# Patient Record
Sex: FEMALE | Race: WHITE | NOT HISPANIC OR LATINO | URBAN - METROPOLITAN AREA
[De-identification: names, ages, dates, MRNs, and addresses within clinical notes are randomized per-mention and may not be internally consistent; named-entity substitution may affect disease eponyms.]

---

## 2021-12-28 ENCOUNTER — INPATIENT (INPATIENT)
Facility: HOSPITAL | Age: 86
LOS: 0 days | DRG: 64 | End: 2021-12-29
Attending: NEUROLOGICAL SURGERY | Admitting: NEUROLOGICAL SURGERY
Payer: MEDICARE

## 2021-12-28 ENCOUNTER — EMERGENCY (EMERGENCY)
Facility: HOSPITAL | Age: 86
LOS: 1 days | Discharge: ANOTHER HOSPITAL | End: 2021-12-28
Attending: EMERGENCY MEDICINE
Payer: MEDICARE

## 2021-12-28 VITALS
TEMPERATURE: 95 F | OXYGEN SATURATION: 100 % | SYSTOLIC BLOOD PRESSURE: 148 MMHG | RESPIRATION RATE: 16 BRPM | HEART RATE: 89 BPM | DIASTOLIC BLOOD PRESSURE: 78 MMHG

## 2021-12-28 VITALS
DIASTOLIC BLOOD PRESSURE: 88 MMHG | RESPIRATION RATE: 17 BRPM | TEMPERATURE: 94 F | SYSTOLIC BLOOD PRESSURE: 198 MMHG | OXYGEN SATURATION: 100 % | HEART RATE: 85 BPM

## 2021-12-28 VITALS
SYSTOLIC BLOOD PRESSURE: 182 MMHG | HEIGHT: 59 IN | RESPIRATION RATE: 22 BRPM | OXYGEN SATURATION: 97 % | WEIGHT: 127.87 LBS | HEART RATE: 90 BPM | DIASTOLIC BLOOD PRESSURE: 96 MMHG

## 2021-12-28 DIAGNOSIS — I61.9 NONTRAUMATIC INTRACEREBRAL HEMORRHAGE, UNSPECIFIED: ICD-10-CM

## 2021-12-28 LAB
ALBUMIN SERPL ELPH-MCNC: 3.9 G/DL — SIGNIFICANT CHANGE UP (ref 3.5–5)
ALBUMIN SERPL ELPH-MCNC: 4.5 G/DL — SIGNIFICANT CHANGE UP (ref 3.3–5)
ALP SERPL-CCNC: 102 U/L — SIGNIFICANT CHANGE UP (ref 40–120)
ALP SERPL-CCNC: 98 U/L — SIGNIFICANT CHANGE UP (ref 40–120)
ALT FLD-CCNC: 19 U/L — SIGNIFICANT CHANGE UP (ref 10–45)
ALT FLD-CCNC: 26 U/L DA — SIGNIFICANT CHANGE UP (ref 10–60)
ANION GAP SERPL CALC-SCNC: 10 MMOL/L — SIGNIFICANT CHANGE UP (ref 5–17)
ANION GAP SERPL CALC-SCNC: 13 MMOL/L — SIGNIFICANT CHANGE UP (ref 5–17)
ANION GAP SERPL CALC-SCNC: 21 MMOL/L — HIGH (ref 5–17)
APPEARANCE UR: CLEAR — SIGNIFICANT CHANGE UP
APTT BLD: 27.2 SEC — LOW (ref 27.5–35.5)
APTT BLD: 27.5 SEC — SIGNIFICANT CHANGE UP (ref 27.5–35.5)
AST SERPL-CCNC: 27 U/L — SIGNIFICANT CHANGE UP (ref 10–40)
AST SERPL-CCNC: 31 U/L — SIGNIFICANT CHANGE UP (ref 10–40)
BASOPHILS # BLD AUTO: 0.02 K/UL — SIGNIFICANT CHANGE UP (ref 0–0.2)
BASOPHILS # BLD AUTO: 0.04 K/UL — SIGNIFICANT CHANGE UP (ref 0–0.2)
BASOPHILS NFR BLD AUTO: 0.2 % — SIGNIFICANT CHANGE UP (ref 0–2)
BASOPHILS NFR BLD AUTO: 0.8 % — SIGNIFICANT CHANGE UP (ref 0–2)
BILIRUB SERPL-MCNC: 0.3 MG/DL — SIGNIFICANT CHANGE UP (ref 0.2–1.2)
BILIRUB SERPL-MCNC: 0.5 MG/DL — SIGNIFICANT CHANGE UP (ref 0.2–1.2)
BILIRUB UR-MCNC: NEGATIVE — SIGNIFICANT CHANGE UP
BLD GP AB SCN SERPL QL: NEGATIVE — SIGNIFICANT CHANGE UP
BUN SERPL-MCNC: 17 MG/DL — SIGNIFICANT CHANGE UP (ref 7–23)
BUN SERPL-MCNC: 23 MG/DL — SIGNIFICANT CHANGE UP (ref 7–23)
BUN SERPL-MCNC: 26 MG/DL — HIGH (ref 7–18)
CALCIUM SERPL-MCNC: 10.2 MG/DL — SIGNIFICANT CHANGE UP (ref 8.4–10.5)
CALCIUM SERPL-MCNC: 9.7 MG/DL — SIGNIFICANT CHANGE UP (ref 8.4–10.5)
CALCIUM SERPL-MCNC: 9.9 MG/DL — SIGNIFICANT CHANGE UP (ref 8.4–10.5)
CHLORIDE SERPL-SCNC: 103 MMOL/L — SIGNIFICANT CHANGE UP (ref 96–108)
CHLORIDE SERPL-SCNC: 111 MMOL/L — HIGH (ref 96–108)
CHLORIDE SERPL-SCNC: 96 MMOL/L — SIGNIFICANT CHANGE UP (ref 96–108)
CK SERPL-CCNC: 114 U/L — SIGNIFICANT CHANGE UP (ref 21–215)
CK SERPL-CCNC: 119 U/L — SIGNIFICANT CHANGE UP (ref 25–170)
CO2 SERPL-SCNC: 21 MMOL/L — LOW (ref 22–31)
CO2 SERPL-SCNC: 23 MMOL/L — SIGNIFICANT CHANGE UP (ref 22–31)
CO2 SERPL-SCNC: 27 MMOL/L — SIGNIFICANT CHANGE UP (ref 22–31)
COLOR SPEC: YELLOW — SIGNIFICANT CHANGE UP
CREAT SERPL-MCNC: 0.67 MG/DL — SIGNIFICANT CHANGE UP (ref 0.5–1.3)
CREAT SERPL-MCNC: 0.71 MG/DL — SIGNIFICANT CHANGE UP (ref 0.5–1.3)
CREAT SERPL-MCNC: 0.81 MG/DL — SIGNIFICANT CHANGE UP (ref 0.5–1.3)
DIFF PNL FLD: ABNORMAL
EOSINOPHIL # BLD AUTO: 0.01 K/UL — SIGNIFICANT CHANGE UP (ref 0–0.5)
EOSINOPHIL # BLD AUTO: 0.19 K/UL — SIGNIFICANT CHANGE UP (ref 0–0.5)
EOSINOPHIL NFR BLD AUTO: 0.1 % — SIGNIFICANT CHANGE UP (ref 0–6)
EOSINOPHIL NFR BLD AUTO: 3.7 % — SIGNIFICANT CHANGE UP (ref 0–6)
ETHANOL SERPL-MCNC: <3 MG/DL — SIGNIFICANT CHANGE UP (ref 0–10)
GLUCOSE SERPL-MCNC: 123 MG/DL — HIGH (ref 70–99)
GLUCOSE SERPL-MCNC: 132 MG/DL — HIGH (ref 70–99)
GLUCOSE SERPL-MCNC: 199 MG/DL — HIGH (ref 70–99)
GLUCOSE UR QL: NEGATIVE — SIGNIFICANT CHANGE UP
HCT VFR BLD CALC: 39 % — SIGNIFICANT CHANGE UP (ref 34.5–45)
HCT VFR BLD CALC: 39 % — SIGNIFICANT CHANGE UP (ref 34.5–45)
HEPARINASE TEG R TIME: 4.5 MIN — SIGNIFICANT CHANGE UP (ref 4.3–8.3)
HGB BLD-MCNC: 13 G/DL — SIGNIFICANT CHANGE UP (ref 11.5–15.5)
HGB BLD-MCNC: 13.1 G/DL — SIGNIFICANT CHANGE UP (ref 11.5–15.5)
IMM GRANULOCYTES NFR BLD AUTO: 0.4 % — SIGNIFICANT CHANGE UP (ref 0–1.5)
IMM GRANULOCYTES NFR BLD AUTO: 1.5 % — SIGNIFICANT CHANGE UP (ref 0–1.5)
INR BLD: 0.95 RATIO — SIGNIFICANT CHANGE UP (ref 0.88–1.16)
INR BLD: 0.96 RATIO — SIGNIFICANT CHANGE UP (ref 0.88–1.16)
KETONES UR-MCNC: NEGATIVE — SIGNIFICANT CHANGE UP
LACTATE SERPL-SCNC: 1.2 MMOL/L — SIGNIFICANT CHANGE UP (ref 0.7–2)
LEUKOCYTE ESTERASE UR-ACNC: NEGATIVE — SIGNIFICANT CHANGE UP
LIDOCAIN IGE QN: 153 U/L — SIGNIFICANT CHANGE UP (ref 73–393)
LYMPHOCYTES # BLD AUTO: 0.91 K/UL — LOW (ref 1–3.3)
LYMPHOCYTES # BLD AUTO: 1.85 K/UL — SIGNIFICANT CHANGE UP (ref 1–3.3)
LYMPHOCYTES # BLD AUTO: 35.7 % — SIGNIFICANT CHANGE UP (ref 13–44)
LYMPHOCYTES # BLD AUTO: 8 % — LOW (ref 13–44)
MAGNESIUM SERPL-MCNC: 2 MG/DL — SIGNIFICANT CHANGE UP (ref 1.6–2.6)
MCHC RBC-ENTMCNC: 30.9 PG — SIGNIFICANT CHANGE UP (ref 27–34)
MCHC RBC-ENTMCNC: 31 PG — SIGNIFICANT CHANGE UP (ref 27–34)
MCHC RBC-ENTMCNC: 33.3 GM/DL — SIGNIFICANT CHANGE UP (ref 32–36)
MCHC RBC-ENTMCNC: 33.6 GM/DL — SIGNIFICANT CHANGE UP (ref 32–36)
MCV RBC AUTO: 92.4 FL — SIGNIFICANT CHANGE UP (ref 80–100)
MCV RBC AUTO: 92.6 FL — SIGNIFICANT CHANGE UP (ref 80–100)
MONOCYTES # BLD AUTO: 0.55 K/UL — SIGNIFICANT CHANGE UP (ref 0–0.9)
MONOCYTES # BLD AUTO: 0.68 K/UL — SIGNIFICANT CHANGE UP (ref 0–0.9)
MONOCYTES NFR BLD AUTO: 10.6 % — SIGNIFICANT CHANGE UP (ref 2–14)
MONOCYTES NFR BLD AUTO: 6 % — SIGNIFICANT CHANGE UP (ref 2–14)
NEUTROPHILS # BLD AUTO: 2.47 K/UL — SIGNIFICANT CHANGE UP (ref 1.8–7.4)
NEUTROPHILS # BLD AUTO: 9.75 K/UL — HIGH (ref 1.8–7.4)
NEUTROPHILS NFR BLD AUTO: 47.7 % — SIGNIFICANT CHANGE UP (ref 43–77)
NEUTROPHILS NFR BLD AUTO: 85.3 % — HIGH (ref 43–77)
NITRITE UR-MCNC: NEGATIVE — SIGNIFICANT CHANGE UP
NRBC # BLD: 0 /100 WBCS — SIGNIFICANT CHANGE UP (ref 0–0)
NRBC # BLD: 0 /100 WBCS — SIGNIFICANT CHANGE UP (ref 0–0)
PA ADP PRP-ACNC: 232 PRU — SIGNIFICANT CHANGE UP (ref 194–417)
PH UR: 7 — SIGNIFICANT CHANGE UP (ref 5–8)
PHOSPHATE SERPL-MCNC: 3.2 MG/DL — SIGNIFICANT CHANGE UP (ref 2.5–4.5)
PLATELET # BLD AUTO: 201 K/UL — SIGNIFICANT CHANGE UP (ref 150–400)
PLATELET # BLD AUTO: 228 K/UL — SIGNIFICANT CHANGE UP (ref 150–400)
PLATELET MAPPING ACTF MAX AMPLITUDE: 12.4 MM — SIGNIFICANT CHANGE UP (ref 2–19)
PLATELET MAPPING ADP MAXIMUM AMPLITUDE: 56.8 MM — SIGNIFICANT CHANGE UP (ref 45–69)
PLATELET MAPPING ADP PERCENT INHIBITION: 6.9 % — SIGNIFICANT CHANGE UP (ref 0–17)
PLATELET MAPPING HKH MAXIMUM AMPLITUDE: 60.1 MM — SIGNIFICANT CHANGE UP (ref 53–68)
PLATELET RESPONSE ASPIRIN RESULT: 642 ARU — SIGNIFICANT CHANGE UP (ref 350–700)
POTASSIUM SERPL-MCNC: 2.9 MMOL/L — CRITICAL LOW (ref 3.5–5.3)
POTASSIUM SERPL-MCNC: 3.5 MMOL/L — SIGNIFICANT CHANGE UP (ref 3.5–5.3)
POTASSIUM SERPL-MCNC: 4.8 MMOL/L — SIGNIFICANT CHANGE UP (ref 3.5–5.3)
POTASSIUM SERPL-SCNC: 2.9 MMOL/L — CRITICAL LOW (ref 3.5–5.3)
POTASSIUM SERPL-SCNC: 3.5 MMOL/L — SIGNIFICANT CHANGE UP (ref 3.5–5.3)
POTASSIUM SERPL-SCNC: 4.8 MMOL/L — SIGNIFICANT CHANGE UP (ref 3.5–5.3)
PROT SERPL-MCNC: 7.4 G/DL — SIGNIFICANT CHANGE UP (ref 6–8.3)
PROT SERPL-MCNC: 7.5 G/DL — SIGNIFICANT CHANGE UP (ref 6–8.3)
PROT UR-MCNC: 15
PROTHROM AB SERPL-ACNC: 11.4 SEC — SIGNIFICANT CHANGE UP (ref 10.6–13.6)
PROTHROM AB SERPL-ACNC: 11.5 SEC — SIGNIFICANT CHANGE UP (ref 10.6–13.6)
RAPIDTEG MAXIMUM AMPLITUDE: 62.2 MM — SIGNIFICANT CHANGE UP (ref 52–70)
RBC # BLD: 4.21 M/UL — SIGNIFICANT CHANGE UP (ref 3.8–5.2)
RBC # BLD: 4.22 M/UL — SIGNIFICANT CHANGE UP (ref 3.8–5.2)
RBC # FLD: 13.5 % — SIGNIFICANT CHANGE UP (ref 10.3–14.5)
RBC # FLD: 13.6 % — SIGNIFICANT CHANGE UP (ref 10.3–14.5)
RH IG SCN BLD-IMP: POSITIVE — SIGNIFICANT CHANGE UP
SARS-COV-2 RNA SPEC QL NAA+PROBE: SIGNIFICANT CHANGE UP
SODIUM SERPL-SCNC: 138 MMOL/L — SIGNIFICANT CHANGE UP (ref 135–145)
SODIUM SERPL-SCNC: 140 MMOL/L — SIGNIFICANT CHANGE UP (ref 135–145)
SODIUM SERPL-SCNC: 147 MMOL/L — HIGH (ref 135–145)
SP GR SPEC: 1.01 — SIGNIFICANT CHANGE UP (ref 1.01–1.02)
TEG FUNCTIONAL FIBRINOGEN: 19.4 MM — SIGNIFICANT CHANGE UP (ref 15–32)
TEG MAXIMUM AMPLITUDE: 62.5 MM — SIGNIFICANT CHANGE UP (ref 52–69)
TEG REACTION TIME: 5.1 MIN — SIGNIFICANT CHANGE UP (ref 4.6–9.1)
TROPONIN I, HIGH SENSITIVITY RESULT: 10.7 NG/L — SIGNIFICANT CHANGE UP
TROPONIN T, HIGH SENSITIVITY RESULT: 117 NG/L — HIGH (ref 0–51)
TROPONIN T, HIGH SENSITIVITY RESULT: 223 NG/L — HIGH (ref 0–51)
UROBILINOGEN FLD QL: NEGATIVE — SIGNIFICANT CHANGE UP
WBC # BLD: 11.42 K/UL — HIGH (ref 3.8–10.5)
WBC # BLD: 5.18 K/UL — SIGNIFICANT CHANGE UP (ref 3.8–10.5)
WBC # FLD AUTO: 11.42 K/UL — HIGH (ref 3.8–10.5)
WBC # FLD AUTO: 5.18 K/UL — SIGNIFICANT CHANGE UP (ref 3.8–10.5)

## 2021-12-28 PROCEDURE — 93010 ELECTROCARDIOGRAM REPORT: CPT

## 2021-12-28 PROCEDURE — 85730 THROMBOPLASTIN TIME PARTIAL: CPT

## 2021-12-28 PROCEDURE — 99285 EMERGENCY DEPT VISIT HI MDM: CPT | Mod: 25

## 2021-12-28 PROCEDURE — 72125 CT NECK SPINE W/O DYE: CPT | Mod: MA

## 2021-12-28 PROCEDURE — 72125 CT NECK SPINE W/O DYE: CPT | Mod: 26,MA

## 2021-12-28 PROCEDURE — 70450 CT HEAD/BRAIN W/O DYE: CPT | Mod: 26,MA

## 2021-12-28 PROCEDURE — 72170 X-RAY EXAM OF PELVIS: CPT

## 2021-12-28 PROCEDURE — 86850 RBC ANTIBODY SCREEN: CPT

## 2021-12-28 PROCEDURE — 70450 CT HEAD/BRAIN W/O DYE: CPT | Mod: 26,77

## 2021-12-28 PROCEDURE — 99291 CRITICAL CARE FIRST HOUR: CPT

## 2021-12-28 PROCEDURE — 80307 DRUG TEST PRSMV CHEM ANLYZR: CPT

## 2021-12-28 PROCEDURE — 74177 CT ABD & PELVIS W/CONTRAST: CPT | Mod: MA

## 2021-12-28 PROCEDURE — 74177 CT ABD & PELVIS W/CONTRAST: CPT | Mod: 26,MA

## 2021-12-28 PROCEDURE — 43753 TX GASTRO INTUB W/ASP: CPT

## 2021-12-28 PROCEDURE — 81001 URINALYSIS AUTO W/SCOPE: CPT

## 2021-12-28 PROCEDURE — 70450 CT HEAD/BRAIN W/O DYE: CPT | Mod: MA

## 2021-12-28 PROCEDURE — 82550 ASSAY OF CK (CPK): CPT

## 2021-12-28 PROCEDURE — 85025 COMPLETE CBC W/AUTO DIFF WBC: CPT

## 2021-12-28 PROCEDURE — 36415 COLL VENOUS BLD VENIPUNCTURE: CPT

## 2021-12-28 PROCEDURE — 82962 GLUCOSE BLOOD TEST: CPT

## 2021-12-28 PROCEDURE — 72170 X-RAY EXAM OF PELVIS: CPT | Mod: 26

## 2021-12-28 PROCEDURE — 96375 TX/PRO/DX INJ NEW DRUG ADDON: CPT | Mod: XU

## 2021-12-28 PROCEDURE — 94002 VENT MGMT INPAT INIT DAY: CPT

## 2021-12-28 PROCEDURE — L8699: CPT

## 2021-12-28 PROCEDURE — 31500 INSERT EMERGENCY AIRWAY: CPT

## 2021-12-28 PROCEDURE — 71260 CT THORAX DX C+: CPT | Mod: MA

## 2021-12-28 PROCEDURE — 83690 ASSAY OF LIPASE: CPT

## 2021-12-28 PROCEDURE — 83605 ASSAY OF LACTIC ACID: CPT

## 2021-12-28 PROCEDURE — 84484 ASSAY OF TROPONIN QUANT: CPT

## 2021-12-28 PROCEDURE — 87635 SARS-COV-2 COVID-19 AMP PRB: CPT

## 2021-12-28 PROCEDURE — 71045 X-RAY EXAM CHEST 1 VIEW: CPT

## 2021-12-28 PROCEDURE — 86900 BLOOD TYPING SEROLOGIC ABO: CPT

## 2021-12-28 PROCEDURE — 71260 CT THORAX DX C+: CPT | Mod: 26,MA

## 2021-12-28 PROCEDURE — 71045 X-RAY EXAM CHEST 1 VIEW: CPT | Mod: 26,77

## 2021-12-28 PROCEDURE — 93005 ELECTROCARDIOGRAM TRACING: CPT

## 2021-12-28 PROCEDURE — 85610 PROTHROMBIN TIME: CPT

## 2021-12-28 PROCEDURE — 80053 COMPREHEN METABOLIC PANEL: CPT

## 2021-12-28 PROCEDURE — 86901 BLOOD TYPING SEROLOGIC RH(D): CPT

## 2021-12-28 PROCEDURE — 99292 CRITICAL CARE ADDL 30 MIN: CPT

## 2021-12-28 PROCEDURE — 99221 1ST HOSP IP/OBS SF/LOW 40: CPT

## 2021-12-28 PROCEDURE — 71045 X-RAY EXAM CHEST 1 VIEW: CPT | Mod: 26

## 2021-12-28 PROCEDURE — 96374 THER/PROPH/DIAG INJ IV PUSH: CPT | Mod: XU

## 2021-12-28 RX ORDER — PROPOFOL 10 MG/ML
10 INJECTION, EMULSION INTRAVENOUS
Qty: 500 | Refills: 0 | Status: DISCONTINUED | OUTPATIENT
Start: 2021-12-28 | End: 2021-12-31

## 2021-12-28 RX ORDER — POTASSIUM CHLORIDE 20 MEQ
10 PACKET (EA) ORAL
Refills: 0 | Status: COMPLETED | OUTPATIENT
Start: 2021-12-28 | End: 2021-12-28

## 2021-12-28 RX ORDER — LEVETIRACETAM 250 MG/1
1000 TABLET, FILM COATED ORAL ONCE
Refills: 0 | Status: DISCONTINUED | OUTPATIENT
Start: 2021-12-28 | End: 2021-12-31

## 2021-12-28 RX ORDER — MANNITOL
29 POWDER (GRAM) MISCELLANEOUS ONCE
Refills: 0 | Status: COMPLETED | OUTPATIENT
Start: 2021-12-28 | End: 2021-12-28

## 2021-12-28 RX ORDER — NICARDIPINE HYDROCHLORIDE 30 MG/1
5 CAPSULE, EXTENDED RELEASE ORAL
Qty: 40 | Refills: 0 | Status: DISCONTINUED | OUTPATIENT
Start: 2021-12-28 | End: 2021-12-28

## 2021-12-28 RX ORDER — PHENYLEPHRINE HYDROCHLORIDE 10 MG/ML
0.1 INJECTION INTRAVENOUS
Qty: 40 | Refills: 0 | Status: DISCONTINUED | OUTPATIENT
Start: 2021-12-28 | End: 2021-12-29

## 2021-12-28 RX ORDER — POTASSIUM CHLORIDE 20 MEQ
40 PACKET (EA) ORAL ONCE
Refills: 0 | Status: COMPLETED | OUTPATIENT
Start: 2021-12-28 | End: 2021-12-28

## 2021-12-28 RX ORDER — PROPOFOL 10 MG/ML
10 INJECTION, EMULSION INTRAVENOUS
Qty: 500 | Refills: 0 | Status: DISCONTINUED | OUTPATIENT
Start: 2021-12-28 | End: 2021-12-28

## 2021-12-28 RX ORDER — NICARDIPINE HYDROCHLORIDE 30 MG/1
5 CAPSULE, EXTENDED RELEASE ORAL
Qty: 40 | Refills: 0 | Status: DISCONTINUED | OUTPATIENT
Start: 2021-12-28 | End: 2021-12-31

## 2021-12-28 RX ORDER — TETANUS AND DIPHTHERIA TOXOIDS ADSORBED 2; 2 [LF]/.5ML; [LF]/.5ML
0.5 INJECTION INTRAMUSCULAR ONCE
Refills: 0 | Status: DISCONTINUED | OUTPATIENT
Start: 2021-12-28 | End: 2021-12-31

## 2021-12-28 RX ORDER — SENNA PLUS 8.6 MG/1
2 TABLET ORAL AT BEDTIME
Refills: 0 | Status: DISCONTINUED | OUTPATIENT
Start: 2021-12-28 | End: 2021-12-29

## 2021-12-28 RX ORDER — FENTANYL CITRATE 50 UG/ML
25 INJECTION INTRAVENOUS ONCE
Refills: 0 | Status: COMPLETED | OUTPATIENT
Start: 2021-12-28 | End: 2021-12-28

## 2021-12-28 RX ORDER — ETOMIDATE 2 MG/ML
40 INJECTION INTRAVENOUS ONCE
Refills: 0 | Status: COMPLETED | OUTPATIENT
Start: 2021-12-28 | End: 2021-12-28

## 2021-12-28 RX ORDER — LEVETIRACETAM 250 MG/1
500 TABLET, FILM COATED ORAL EVERY 12 HOURS
Refills: 0 | Status: DISCONTINUED | OUTPATIENT
Start: 2021-12-28 | End: 2021-12-29

## 2021-12-28 RX ORDER — SUCCINYLCHOLINE CHLORIDE 100 MG/5ML
100 SYRINGE (ML) INTRAVENOUS ONCE
Refills: 0 | Status: COMPLETED | OUTPATIENT
Start: 2021-12-28 | End: 2021-12-28

## 2021-12-28 RX ORDER — CHLORHEXIDINE GLUCONATE 213 G/1000ML
15 SOLUTION TOPICAL EVERY 12 HOURS
Refills: 0 | Status: DISCONTINUED | OUTPATIENT
Start: 2021-12-28 | End: 2021-12-31

## 2021-12-28 RX ORDER — ROCURONIUM BROMIDE 10 MG/ML
70 VIAL (ML) INTRAVENOUS ONCE
Refills: 0 | Status: COMPLETED | OUTPATIENT
Start: 2021-12-28 | End: 2021-12-28

## 2021-12-28 RX ORDER — DEXMEDETOMIDINE HYDROCHLORIDE IN 0.9% SODIUM CHLORIDE 4 UG/ML
0.2 INJECTION INTRAVENOUS
Qty: 200 | Refills: 0 | Status: DISCONTINUED | OUTPATIENT
Start: 2021-12-28 | End: 2021-12-28

## 2021-12-28 RX ORDER — SODIUM CHLORIDE 9 MG/ML
1000 INJECTION INTRAMUSCULAR; INTRAVENOUS; SUBCUTANEOUS ONCE
Refills: 0 | Status: COMPLETED | OUTPATIENT
Start: 2021-12-28 | End: 2021-12-28

## 2021-12-28 RX ORDER — CHLORHEXIDINE GLUCONATE 213 G/1000ML
15 SOLUTION TOPICAL EVERY 12 HOURS
Refills: 0 | Status: DISCONTINUED | OUTPATIENT
Start: 2021-12-28 | End: 2021-12-29

## 2021-12-28 RX ADMIN — PROPOFOL 4.2 MICROGRAM(S)/KG/MIN: 10 INJECTION, EMULSION INTRAVENOUS at 16:31

## 2021-12-28 RX ADMIN — Medication 145 GRAM(S): at 13:58

## 2021-12-28 RX ADMIN — NICARDIPINE HYDROCHLORIDE 25 MG/HR: 30 CAPSULE, EXTENDED RELEASE ORAL at 17:43

## 2021-12-28 RX ADMIN — Medication 100 MILLIGRAM(S): at 12:10

## 2021-12-28 RX ADMIN — NICARDIPINE HYDROCHLORIDE 25 MG/HR: 30 CAPSULE, EXTENDED RELEASE ORAL at 16:32

## 2021-12-28 RX ADMIN — CHLORHEXIDINE GLUCONATE 15 MILLILITER(S): 213 SOLUTION TOPICAL at 17:43

## 2021-12-28 RX ADMIN — Medication 100 MILLIEQUIVALENT(S): at 20:00

## 2021-12-28 RX ADMIN — PROPOFOL 3.48 MICROGRAM(S)/KG/MIN: 10 INJECTION, EMULSION INTRAVENOUS at 12:26

## 2021-12-28 RX ADMIN — NICARDIPINE HYDROCHLORIDE 25 MG/HR: 30 CAPSULE, EXTENDED RELEASE ORAL at 13:37

## 2021-12-28 RX ADMIN — Medication 70 MILLIGRAM(S): at 12:45

## 2021-12-28 RX ADMIN — Medication 100 MILLIEQUIVALENT(S): at 18:50

## 2021-12-28 RX ADMIN — SODIUM CHLORIDE 2000 MILLILITER(S): 9 INJECTION INTRAMUSCULAR; INTRAVENOUS; SUBCUTANEOUS at 20:15

## 2021-12-28 RX ADMIN — Medication 100 MILLIEQUIVALENT(S): at 21:00

## 2021-12-28 RX ADMIN — ETOMIDATE 40 MILLIGRAM(S): 2 INJECTION INTRAVENOUS at 12:10

## 2021-12-28 RX ADMIN — Medication 40 MILLIEQUIVALENT(S): at 20:47

## 2021-12-28 RX ADMIN — LEVETIRACETAM 400 MILLIGRAM(S): 250 TABLET, FILM COATED ORAL at 17:43

## 2021-12-28 NOTE — CONSULT NOTE ADULT - SUBJECTIVE AND OBJECTIVE BOX
p ([id number])  HPI  89F unknown AC/AP xfer FH found down in street for unknown period of time. GCS7, then intubated for airway. Reportedly poor exam pre-intubation. After int, reportedly localized LUE otherwise no mvmt. Getting mannitol, AED at . Unable to contact family. CTH: large L FP IPH w/ sig MLS and mass effect. Exam: intubated, no EO, no corneals, +cough/gag, L pupil 4NR R pupil 3NR, extensors uppers, TF lowers. GCS 3T    --Anticoagulation:    =====================  PAST MEDICAL HISTORY     PAST SURGICAL HISTORY         MEDICATIONS:  Antibiotics:    Neuro:    Other:      SOCIAL HISTORY:   Occupation:   Marital Status:     FAMILY HISTORY:      ROS: Negative except per HPI    LABS:

## 2021-12-28 NOTE — H&P ADULT - HISTORY OF PRESENT ILLNESS
HPI  89F unknown AC/AP xfer FH found down in street for unknown period of time. GCS7, then intubated for airway. Reportedly poor exam pre-intubation. After int, reportedly localized LUE otherwise no mvmt. Getting mannitol, AED at . Unable to contact family. CTH: large L FP IPH w/ sig MLS and mass effect. Exam: intubated, no EO, no corneals, +cough/gag, L pupil 4NR R pupil 3NR, extensors uppers, TF lowers. GCS 3T

## 2021-12-28 NOTE — ED ADULT NURSE NOTE - NSIMPLEMENTINTERV_GEN_ALL_ED
Implemented All Fall with Harm Risk Interventions:  [place] to call system. Call bell, personal items and telephone within reach. Instruct patient to call for assistance. Room bathroom lighting operational. Non-slip footwear when patient is off stretcher. Physically safe environment: no spills, clutter or unnecessary equipment. Stretcher in lowest position, wheels locked, appropriate side rails in place. Provide visual cue, wrist band, yellow gown, etc. Monitor gait and stability. Monitor for mental status changes and reorient to person, place, and time. Review medications for side effects contributing to fall risk. Reinforce activity limits and safety measures with patient and family. Provide visual clues: red socks.

## 2021-12-28 NOTE — DISCHARGE NOTE NURSING/CASE MANAGEMENT/SOCIAL WORK - PATIENT PORTAL LINK FT
You can access the FollowMyHealth Patient Portal offered by [facility] by registering at the following website: [email or web address]. By joining [facility] FollowMyHealth portal, you will also be able to view your health information using other applications (apps) compatible with our system.

## 2021-12-28 NOTE — ED PROVIDER NOTE - PROGRESS NOTE DETAILS
I spoke with Dr. Bailey of neurosurgical trauma at [facility] and reviewed the CT head with him. He recommended giving fentanyl for pain, keppra for seizure prophylaxis, mannitol to decrease ICP, and sbp < 140. He accepted the transfer to the [facility] ED, tier 1. -Evangelist RUDOLPH I tried to call the phone numbers listed in sunrise to obtain consent to transfer and noone answered. Therefore Dr. Altamirano and I signed 2PC for consent due to need for service not available here at [facility]. Clarke RUDOLPH The patients granddaughter Leslie Leahy [phone] called and I informed her about what happened with the patient and told her the patient already left and is at [facility] for evaluation by neurosurgery and trauma surgery. Clarke RUDOLPH

## 2021-12-28 NOTE — ED PROVIDER NOTE - CLINICAL SUMMARY MEDICAL DECISION MAKING FREE TEXT BOX
89F found on street by bystander, unresponsive. EMS reports bystanders didn't witness fall.  -stat intubation for airway protection as GCS7 on arrival  -fs, ecg, monitor  -cbc, cmp, coags, t&s, covid swab  -ct head, c-spine, thorax, abd/pelvis given unknown mechanism  -given obvious ICH on cth, likely traumatic, will xfr to trauma center

## 2021-12-28 NOTE — ED PROVIDER NOTE - OBJECTIVE STATEMENT
89 y/o F with unknown PMH presents to the ED as a transfer from [place] for ICH. Patient was found down outside, was found to have ICH with 8.5mm of shift. Patient intubated for a GCS of 9 and transferred to the ED. Hx limited at this time.

## 2021-12-28 NOTE — PROGRESS NOTE ADULT - SUBJECTIVE AND OBJECTIVE BOX
HPI:  3F unknown AC/AP xfer  found down in street for unknown period of time. GCS7, then intubated for airway. Reportedly poor exam pre-intubation. After int, reportedly localized LUE otherwise no mvmt. Getting mannitol, AED at . Unable to contact family. CTH: large L FP IPH w/ sig MLS and mass effect.    VITALS:  T(C): , Max: 34.8 (12-28-21 @ 14:55)  HR:  (80 - 90)  BP:  (148/78 - 198/88)  ABP: --  RR:  (16 - 22)  SpO2:  (97% - 100%)  Wt(kg): --  Device: Avea, Mode: AC/ CMV (Assist Control/ Continuous Mandatory Ventilation), RR (machine): 12, TV (machine): 450, FiO2: 100, PEEP: 5, ITime: 0.8, MAP: 10, PIP: 26    LABS:  Na: 138 (12-28 @ 15:53), 140 (12-28 @ 12:28)  K: 2.9 (12-28 @ 15:53), 3.5 (12-28 @ 12:28)  Cl: 96 (12-28 @ 15:53), 103 (12-28 @ 12:28)  CO2: 21 (12-28 @ 15:53), 27 (12-28 @ 12:28)  BUN: 23 (12-28 @ 15:53), 26 (12-28 @ 12:28)  Cr: 0.71 (12-28 @ 15:53), 0.81 (12-28 @ 12:28)  Glu: 199(12-28 @ 15:53), 132(12-28 @ 12:28)    Hgb: 13.1 (12-28 @ 15:53), 13.0 (12-28 @ 12:28)  Hct: 39.0 (12-28 @ 15:53), 39.0 (12-28 @ 12:28)  WBC: 11.42 (12-28 @ 15:53), 5.18 (12-28 @ 12:28)  Plt: 228 (12-28 @ 15:53), 201 (12-28 @ 12:28)  PT: 11.4 (12-28 @ 15:53)  INR: 0.95 (12-28 @ 15:53)  aPTT: 27.5 (12-28 @ 15:53), PT: 11.5 (12-28 @ 12:28)  INR: 0.96 (12-28 @ 12:28)  aPTT: 27.2 (12-28 @ 12:28)    IMAGING:   Recent imaging studies were reviewed.    MEDICATIONS:  chlorhexidine 0.12% Liquid 15 milliLiter(s) Oral Mucosa every 12 hours  niCARdipine Infusion 5 mG/Hr IV Continuous <Continuous>  propofol Infusion 10 MICROgram(s)/kG/Min IV Continuous <Continuous>  senna 2 Tablet(s) Oral at bedtime PRN    EXAMINATION:  General:  intubated   HEENT:  MMM  Neuro:  GCS E1M2VT, pupils right 4 mm, left 7 mm both non reactive, corneal at left only, no cough, positive gag, extending with the left UL, flaccid right UL, triple flexion both LL  Cards:  RRR  Respiratory:  Intubated, Mechanical mary grace  Adomen:  soft  Extremities:  no edema  Skin:  warm/dry HPI:  3F unknown AC/AP xfer  found down in street for unknown period of time. GCS7, then intubated for airway. Reportedly poor exam pre-intubation. After int, reportedly localized LUE otherwise no mvmt. Getting mannitol, AED at . Unable to contact family. CTH: large L FP IPH w/ sig MLS and mass effect.    VITALS:  T(C): , Max: 34.8 (12-28-21 @ 14:55)  HR:  (80 - 90)  BP:  (148/78 - 198/88)  ABP: --  RR:  (16 - 22)  SpO2:  (97% - 100%)  Wt(kg): --  Device: Avea, Mode: AC/ CMV (Assist Control/ Continuous Mandatory Ventilation), RR (machine): 12, TV (machine): 450, FiO2: 100, PEEP: 5, ITime: 0.8, MAP: 10, PIP: 26    LABS:  Na: 138 (12-28 @ 15:53), 140 (12-28 @ 12:28)  K: 2.9 (12-28 @ 15:53), 3.5 (12-28 @ 12:28)  Cl: 96 (12-28 @ 15:53), 103 (12-28 @ 12:28)  CO2: 21 (12-28 @ 15:53), 27 (12-28 @ 12:28)  BUN: 23 (12-28 @ 15:53), 26 (12-28 @ 12:28)  Cr: 0.71 (12-28 @ 15:53), 0.81 (12-28 @ 12:28)  Glu: 199(12-28 @ 15:53), 132(12-28 @ 12:28)    Hgb: 13.1 (12-28 @ 15:53), 13.0 (12-28 @ 12:28)  Hct: 39.0 (12-28 @ 15:53), 39.0 (12-28 @ 12:28)  WBC: 11.42 (12-28 @ 15:53), 5.18 (12-28 @ 12:28)  Plt: 228 (12-28 @ 15:53), 201 (12-28 @ 12:28)  PT: 11.4 (12-28 @ 15:53)  INR: 0.95 (12-28 @ 15:53)  aPTT: 27.5 (12-28 @ 15:53), PT: 11.5 (12-28 @ 12:28)  INR: 0.96 (12-28 @ 12:28)  aPTT: 27.2 (12-28 @ 12:28)    IMAGING:   Recent imaging studies were reviewed.    MEDICATIONS:  chlorhexidine 0.12% Liquid 15 milliLiter(s) Oral Mucosa every 12 hours  niCARdipine Infusion 5 mG/Hr IV Continuous <Continuous>  propofol Infusion 10 MICROgram(s)/kG/Min IV Continuous <Continuous>  senna 2 Tablet(s) Oral at bedtime PRN    EXAMINATION:  General:  intubated   HEENT:  MMM  Neuro:  GCS E1M2VT, pupils right 4 mm, left 7 mm both non reactive, corneal at left only, no cough, positive gag, extending with the left UL, flaccid right UL, triple flexion both LL  Cards:  RRR, no murmurs   Respiratory:  Intubated, Mechanical mary grace  Adomen:  soft  Extremities:  no edema  Skin:  warm/dry HPI:  89F unknown AC/AP xfer  found down in street for unknown period of time. GCS7, then intubated for airway. Reportedly poor exam pre-intubation. After int, reportedly localized LUE otherwise no mvmt. Getting mannitol, AED at . Unable to contact family. CTH: large L FP IPH w/ sig MLS and mass effect.    VITALS:  T(C): , Max: 34.8 (12-28-21 @ 14:55)  HR:  (80 - 90)  BP:  (148/78 - 198/88)  ABP: --  RR:  (16 - 22)  SpO2:  (97% - 100%)  Wt(kg): --  Device: Avea, Mode: AC/ CMV (Assist Control/ Continuous Mandatory Ventilation), RR (machine): 12, TV (machine): 450, FiO2: 100, PEEP: 5, ITime: 0.8, MAP: 10, PIP: 26    LABS:  Na: 138 (12-28 @ 15:53), 140 (12-28 @ 12:28)  K: 2.9 (12-28 @ 15:53), 3.5 (12-28 @ 12:28)  Cl: 96 (12-28 @ 15:53), 103 (12-28 @ 12:28)  CO2: 21 (12-28 @ 15:53), 27 (12-28 @ 12:28)  BUN: 23 (12-28 @ 15:53), 26 (12-28 @ 12:28)  Cr: 0.71 (12-28 @ 15:53), 0.81 (12-28 @ 12:28)  Glu: 199(12-28 @ 15:53), 132(12-28 @ 12:28)    Hgb: 13.1 (12-28 @ 15:53), 13.0 (12-28 @ 12:28)  Hct: 39.0 (12-28 @ 15:53), 39.0 (12-28 @ 12:28)  WBC: 11.42 (12-28 @ 15:53), 5.18 (12-28 @ 12:28)  Plt: 228 (12-28 @ 15:53), 201 (12-28 @ 12:28)  PT: 11.4 (12-28 @ 15:53)  INR: 0.95 (12-28 @ 15:53)  aPTT: 27.5 (12-28 @ 15:53), PT: 11.5 (12-28 @ 12:28)  INR: 0.96 (12-28 @ 12:28)  aPTT: 27.2 (12-28 @ 12:28)    IMAGING:   Recent imaging studies were reviewed.    MEDICATIONS:  chlorhexidine 0.12% Liquid 15 milliLiter(s) Oral Mucosa every 12 hours  niCARdipine Infusion 5 mG/Hr IV Continuous <Continuous>  propofol Infusion 10 MICROgram(s)/kG/Min IV Continuous <Continuous>  senna 2 Tablet(s) Oral at bedtime PRN    EXAMINATION:  General:  intubated   HEENT:  MMM  Neuro:  GCS E1M2VT, pupils right 4 mm, left 7 mm both non reactive, corneal at left only, no cough, positive gag, extending with the left UL, flaccid right UL, triple flexion both LL  Cards:  RRR, no murmurs   Respiratory:  Intubated, Mechanical mary grace  Adomen:  soft  Extremities:  no edema  Skin:  warm/dry

## 2021-12-28 NOTE — CONSULT NOTE ADULT - ASSESSMENT
Sherry, Kenna  89F unknown AC/AP xfer  found down in street for unknown period of time. GCS7, then intubated for airway. Reportedly poor exam pre-intubation. After int, reportedly localized LUE otherwise no mvmt. Getting mannitol, AED at . Unable to contact family. CTH: large L FP IPH w/ sig MLS and mass effect. Exam: intubated, no EO, no corneals, +cough/gag, L pupil 4NR R pupil 3NR, extensors uppers, TF lowers. GCS 3T  -Adm NSCU under intensivisit, no nsgy intervention  -CT on arrival  -Plt, coags WNL  -S/p mannitol at   -Remaining care and GOC per NSCU

## 2021-12-28 NOTE — ED PROVIDER NOTE - CLINICAL SUMMARY MEDICAL DECISION MAKING FREE TEXT BOX
89 y/o F with unknown PMH presenting as a transfer from [facility] for ICH and fall. Patient on nicardipine, prop, and mannitol gtt upon arrival. Intubated PTA, GCS 6, withdraws all extremities with non reactive pupils. Tube position confirmed with end-tidal CO2 and cxr. Neurosurgery and trauma at bedside. Plan to admit to NSCU for further evaluation. Dereje Oquendo, DO PGY3

## 2021-12-28 NOTE — ED PROVIDER NOTE - PHYSICAL EXAMINATION
GENERAL: Intubated, sedated  HEENT: abrasion to R forehead, +intubated, pupils non reactive 4mm L, 3mm R  LUNGS: CTAB, no wheezes or crackles   CARDIAC: RRR, no m/r/g  ABDOMEN: Soft, normal BS, non tender, non distended, no rebound, no guarding  BACK: no step off  EXT: No edema, no calf tenderness, 2+ DP pulses bilaterally, no deformities, abrasion to R knee  NEURO: Withdraws all extremities to pain  SKIN: Warm and dry. No rash.

## 2021-12-28 NOTE — PROGRESS NOTE ADULT - ASSESSMENT
ASSESSMENT/PLAN: Large     NEURO:  Activity: [] mobilize as tolerated [] Bedrest [] PT [] OT [] PMNR    PULM:    CV:  SBP goal    RENAL:  Fluids:    GI:  Diet:  GI prophylaxis [] not indicated [] PPI [] other:  Bowel regimen [] colace [] senna [] other:    ENDO:   Goal euglycemia (-180)    HEME/ONC:  VTE prophylaxis: [] SCDs [] chemoprophylaxis [] hold chemoprophylaxis due to: [] high risk of DVT/PE on admission due to:    ID:    MISC:    SOCIAL/FAMILY:  [] awaiting [] updated at bedside [] family meeting    CODE STATUS:  [] Full Code [] DNR [] DNI [] Palliative/Comfort Care    DISPOSITION:  [] ICU [] Stroke Unit [] Floor [] EMU [] RCU [] PCU    [] Patient is at high risk of neurologic deterioration/death due to:     Time seen:  Time spent: ___ [] critical care minutes    Contact: [phone] ASSESSMENT/PLAN: Large L frontal hemorrhage with intraventricular hemorrhage and 2.7cm midline shift, ICH score 4    NEURO:  GOC with family  deteriorating exam  Activity: [] mobilize as tolerated [x] Bedrest [] PT [] OT [] PMNR    PULM:  intubated; 12/450/5/40%    CV:  SBP goal , MAP>60 now requiring phenylephrine support    RENAL:  Fluids: IVF  K>4 replete prn    GI:  Diet: keep NPO  GI prophylaxis [x] not indicated [] PPI [] other:  Bowel regimen [] colace [x] senna [] other:    ENDO:   Goal euglycemia (-180)    HEME/ONC:  VTE prophylaxis: [x] SCDs [] chemoprophylaxis [x] hold chemoprophylaxis due to: ICH [] high risk of DVT/PE on admission due to:    ID: hypothermic at admission, now normothermic, monitor     MISC:    SOCIAL/FAMILY:  [x] awaiting [] updated at bedside [] family meeting    CODE STATUS:  [] Full Code [x] DNR [] DNI [] Palliative/Comfort Care    DISPOSITION:  [x] ICU [] Stroke Unit [] Floor [] EMU [] RCU [] PCU    [x] Patient is at high risk of neurologic deterioration/death due to: ICH/IVH/brain compression      Contact: [phone]

## 2021-12-28 NOTE — DISCHARGE NOTE NURSING/CASE MANAGEMENT/SOCIAL WORK - NSDCPEFALRISK_GEN_ALL_CORE
For information on Fall & Injury Prevention, visit: [email or web address] OR  [email or web address] OR  https://www.cdc.gov/steadi/patient.html

## 2021-12-28 NOTE — H&P ADULT - NSHPPHYSICALEXAM_GEN_ALL_CORE
Exam: intubated, no EO, no corneals, +cough/gag, L pupil 4NR R pupil 3NR, extensors uppers, TF lowers. GCS 3T

## 2021-12-28 NOTE — CONSULT NOTE ADULT - ASSESSMENT
89yo F with unknown PMH presenting as transferred from [facility] 89F unknown PMH presenting as transfer from [facility] with IPH with significant midline shift.  Pt intubated/sedated on arrival and unable to proved hx. Pt with no noted signs of trauma and review of imaging with Nsgx IPH likely 2/2 to stroke.    - Given pt with ICH likely 2/2 to stroke, deemed to be primary bleed with subsequent fall. Downgraded to level 3 trauma.   - Upon discussion with neurosurgery pt will be admitted to NSICU   - BP control per NSgx  - Tertiary exam pending   - Discussed with Dr. Charisse Mon PGY3   ATP surgery   [id number]

## 2021-12-28 NOTE — PROGRESS NOTE ADULT - ASSESSMENT
Summary:   3F unknown AC/AP xfer FH found down in street for unknown period of time. GCS7, then intubated for airway. Reportedly poor exam pre-intubation. After int, reportedly localized LUE otherwise no mvmt. Getting mannitol, AED at . Unable to contact family. CTH: large L FP IPH w/ sig MLS and mass effect.    NEURO:    q1h neuro checks  Neck collar   Head elevation 30 degrees   Keppra 500 BID    CARDS:    -140  Nicardipine gtt     PULM:    PRVC  450/12/5/40  sat > 92%    RENAL:   BMP  NS 75 ml/hr     GASTRO:    NPO  Senna    HEME:    DVT prophylaxis: SCDs,   hold anticoagulation    ENDO:    BG goal 140-180    ID:    Monitor for fever     Social :  Family meeting regarding goals of care     Code status:  Full code  Disposition:  ICU   Summary:   89F unknown AC/AP xfer FH found down in street for unknown period of time. GCS7, then intubated for airway. Reportedly poor exam pre-intubation. After int, reportedly localized LUE otherwise no mvmt. Getting mannitol, AED at . Unable to contact family. CTH: large L FP IPH w/ sig MLS and mass effect.    NEURO:    q1h neuro checks  Neck collar   Head elevation 30 degrees   Keppra 500 BID    CARDS:    -140  Nicardipine gtt     PULM:    PRVC  450/12/5/40  sat > 92%    RENAL:   BMP  NS 75 ml/hr     GASTRO:    NPO  Senna    HEME:    DVT prophylaxis: SCDs,   hold anticoagulation    ENDO:    BG goal 140-180    ID:    Monitor for fever     Social :  Family meeting regarding goals of care     Code status:  Full code  Disposition:  ICU

## 2021-12-28 NOTE — ED PROVIDER NOTE - CARE PLAN
1 Principal Discharge DX:	Traumatic left-sided intracerebral hemorrhage with loss of consciousness  Secondary Diagnosis:	Abrasion of forehead

## 2021-12-28 NOTE — ED PROVIDER NOTE - ATTENDING CONTRIBUTION TO CARE
Patient transferred for higher level of care.    Patient found down outside, taken to [facility], there intubated for low GCS and found to have ICH.  Unclear if traumatic versus hemorrhagic stroke.  Unknown if on blood thinners.  Arrives [facility] intubated on mannitol, nicardipine and propofol gtt.  Unable to give history.  Level I trauma activated because headbleed with shift and intubated.  Primary and secondary survey noted no evidence of significant traumatic injury and evaluated by neurosurgery who suspects ICH is more likely to be secondary to hemorrhagic stroke so trauma downgraded.  Neurosurgery requesting repeat labs including platelet response/TEG and repeat CT head.  They will admit NSCU.  Will continue nicardipine, mannitol and propofol gtt.

## 2021-12-28 NOTE — ED ADULT NURSE NOTE - OBJECTIVE STATEMENT
Pt chuy in from street, found on floor unresponsive covered in vomit. Pt intubated in ER by MD Blanca. 7fr., 22 cm. Pt placed on cardiac monitor, and neck brace.

## 2021-12-28 NOTE — ED ADULT NURSE REASSESSMENT NOTE - NS ED NURSE REASSESS COMMENT FT1
Pt BP systolic 120s, MD Pritchard made aware and advised to lower cardene drip to 2.5 mg/hr.  Safety and comfort maintained.  Will continue to monitor.

## 2021-12-28 NOTE — PROGRESS NOTE ADULT - SUBJECTIVE AND OBJECTIVE BOX
SUMMARY: Per H/P, 89F unknown AC/AP xfer FH found down in street for unknown period of time. GCS7, then intubated for airway. Reportedly poor exam pre-intubation. After int, reportedly localized LUE otherwise no mvmt. Getting mannitol, AED at . Unable to contact family. CTH: large L FP IPH w/ sig MLS and mass effect. Exam: intubated, no EO, no corneals, +cough/gag, L pupil 4NR R pupil 3NR, extensors uppers, TF lowers. GCS 3T    intubated, DNR per family  hypotensive now requiring pressor support    ADMISSION SCORES:   GCS: 7 in ER, now 3T HH: MF: NIHSS: ICH Score: 4    REVIEW OF SYSTEMS: [x] Unable to Assess due to neurologic exam   [ ] All ROS addressed below are non-contributory, except:  Neuro: [ ] Headache [ ] Back pain [ ] Numbness [ ] Weakness [ ] Ataxia [ ] Dizziness [ ] Aphasia [ ] Dysarthria [ ] Visual disturbance  Resp: [ ] Shortness of breath/dyspnea [ ] Orthopnea [ ] Cough  CV: [ ] Chest pain [ ] Palpitation [ ] Lightheadedness [ ] Syncope  Renal: [ ] Thirst [ ] Edema  GI: [ ] Nausea [ ] Emesis [ ] Abdominal pain [ ] Constipation [ ] Diarrhea  Hem: [ ] Hematemesis [ ] bBright red blood per rectum  ID: [ ] Fever [ ] Chills [ ] Dysuria  ENT: [ ] Rhinorrhea    VITALS: [x] Reviewed    IMAGING/DATA: [x] Reviewed    IVF FLUIDS/MEDICATIONS: [x] Reviewed    ALLERGIES: Allergies    Allergy Status Unknown    Intolerances        DEVICES:   [] Restraints [x PIVs [x] ET tube [] central line [] PICC [] arterial line [] quintanilla [x] NGT/OGT [] EVD [] LD [] DENIS/HMV [] LiCOX [] ICP monitor [] Trach [] PEG [] Chest Tube [] other:    EXAMINATION:  General: No acute distress, intubated  HEENT: Anicteric sclerae  Cardiac: [id number]  Lungs: Clear  Abdomen: Soft, non-tender, +BS  Extremities: No c/c/e  Skin/Incision Site: Clean, dry and intact  Neurologic: no EO to noxious, pupils anisocoric and nonreactive, no corneals, no gag, no cough, no movement in all four extremities to noxious stimulation SUMMARY: Per H/P, 89F unknown AC/AP xfer FH found down in street for unknown period of time. GCS7, then intubated for airway. Reportedly poor exam pre-intubation. After int, reportedly localized LUE otherwise no mvmt. Getting mannitol, AED at . Unable to contact family. CTH: large L FP IPH w/ sig MLS and mass effect. Exam: intubated, no EO, no corneals, +cough/gag, L pupil 4NR R pupil 3NR, extensors uppers, TF lowers. GCS 3T    intubated, DNR per family  hypotensive now requiring pressor support    ADMISSION SCORES:   GCS: 7 in ER, now 3T  NIHSS: 36 ICH Score: 4    REVIEW OF SYSTEMS: [x] Unable to Assess due to neurologic exam   [ ] All ROS addressed below are non-contributory, except:  Neuro: [ ] Headache [ ] Back pain [ ] Numbness [ ] Weakness [ ] Ataxia [ ] Dizziness [ ] Aphasia [ ] Dysarthria [ ] Visual disturbance  Resp: [ ] Shortness of breath/dyspnea [ ] Orthopnea [ ] Cough  CV: [ ] Chest pain [ ] Palpitation [ ] Lightheadedness [ ] Syncope  Renal: [ ] Thirst [ ] Edema  GI: [ ] Nausea [ ] Emesis [ ] Abdominal pain [ ] Constipation [ ] Diarrhea  Hem: [ ] Hematemesis [ ] bBright red blood per rectum  ID: [ ] Fever [ ] Chills [ ] Dysuria  ENT: [ ] Rhinorrhea    VITALS: [x] Reviewed    IMAGING/DATA: [x] Reviewed    IVF FLUIDS/MEDICATIONS: [x] Reviewed    ALLERGIES: Allergies    Allergy Status Unknown    Intolerances        DEVICES:   [] Restraints [x PIVs [x] ET tube [] central line [] PICC [] arterial line [] quintanilla [x] NGT/OGT [] EVD [] LD [] DENIS/HMV [] LiCOX [] ICP monitor [] Trach [] PEG [] Chest Tube [] other:    EXAMINATION:  General: No acute distress, intubated  HEENT: Anicteric sclerae  Cardiac: [id number]  Lungs: Clear  Abdomen: Soft, non-tender, +BS  Extremities: No c/c/e  Skin/Incision Site: Clean, dry and intact  Neurologic: no EO to noxious, pupils anisocoric and nonreactive, no corneals, no gag, no cough, no movement in all four extremities to noxious stimulation

## 2021-12-28 NOTE — CONSULT NOTE ADULT - SUBJECTIVE AND OBJECTIVE BOX
HPI:   89 yo F w/ unknown     Primary Survey:    A - airway intact  B - bilateral breath sounds and equal chest rise  C - initial BP:  , HR: , palpable pulses in all extremities  D - GCS 6T on arrival  Exposure obtained    Secondary Survey:   General: Intubated, withdraws to pain in b/l upper and lower extremities (diminished response in LUE)   HEENT: Normocephalic, 2cm abrasion above right eyeborw, pupils unequal and non-reactive   Neck: Soft, midline trachea, C-collar in place.   Chest: No chest wall crepitus or deformities/lacerations/abrasions, thorax stable, no ecchymosis.   Cardiac: RRR, hypertensive on nicardipine gtt   Respiratory: Intubated. Bilateral breath sounds, clear and equal bilaterally.   Abdomen: Soft, non-distended, no ecchymosis. (limited due to mental status). Well healed Pfannenstiel  Pelvis: Stable  Ext: palp radial b/l UE, b/l DP palp in LE. Mild erythema medial aspect of left knee.   Back: No palpable runoff/stepoff/deformity, no abrasions.   Rectal: No topher blood, CARLY with normal tone.     ROS: 10-system review is otherwise negative except HPI above.       HPI:   89F unknown PMH presenting as transfer from [facility] with ICH. Pt intubated/sedated on arrival - unable to proved hx. Per EMS pt FH found down in street for unknown period of time, found with GCS7, then intubated for airway protection. At OSH CTH noted with large left IPH with midline shift. She was started on mannitol and nicardipine for SBP >190 and was transferred to [facility]. On arrival propofol held for neuro exam. Pt intubated, with no corneal reflex, +cough/gag, L pupil 4mm, R 3mm and non-reactive.     In trauma bay pt afebrile and HD stable without signs of trauma. Per review of imaging with Nsgx, IPH likely primary bleed 2/2 to stroke. Unable to contact family.     Primary Survey:    A - airway intact  B - bilateral breath sounds and equal chest rise  C - initial BP:  , HR: , palpable pulses in all extremities  D - GCS 6T on arrival  Exposure obtained    Secondary Survey:   General: Intubated, withdraws to pain in b/l upper and lower extremities (diminished response in LUE)   HEENT: Normocephalic, 2cm abrasion above right eyeborw, pupils unequal and non-reactive   Neck: Soft, midline trachea, C-collar in place.   Chest: No chest wall crepitus or deformities/lacerations/abrasions, thorax stable, no ecchymosis.   Cardiac: RRR, hypertensive on nicardipine gtt   Respiratory: Intubated. Bilateral breath sounds, clear and equal bilaterally.   Abdomen: Soft, non-distended, no ecchymosis. (limited due to mental status). Well healed Pfannenstiel  Pelvis: Stable  Ext: palp radial b/l UE, b/l DP palp in LE. Mild erythema medial aspect of left knee.   Back: No palpable runoff/stepoff/deformity, no abrasions.   Rectal: No topher blood, CARLY with normal tone.     ROS: 10-system review is otherwise negative except HPI above.

## 2021-12-29 VITALS
SYSTOLIC BLOOD PRESSURE: 129 MMHG | OXYGEN SATURATION: 100 % | RESPIRATION RATE: 12 BRPM | HEART RATE: 68 BPM | DIASTOLIC BLOOD PRESSURE: 71 MMHG

## 2021-12-29 LAB
A1C WITH ESTIMATED AVERAGE GLUCOSE RESULT: 5.6 % — SIGNIFICANT CHANGE UP (ref 4–5.6)
ANION GAP SERPL CALC-SCNC: 10 MMOL/L — SIGNIFICANT CHANGE UP (ref 5–17)
ANION GAP SERPL CALC-SCNC: 14 MMOL/L — SIGNIFICANT CHANGE UP (ref 5–17)
ANION GAP SERPL CALC-SCNC: 14 MMOL/L — SIGNIFICANT CHANGE UP (ref 5–17)
BASE EXCESS BLDA CALC-SCNC: 1.3 MMOL/L — SIGNIFICANT CHANGE UP (ref -2–3)
BASOPHILS # BLD AUTO: 0.02 K/UL — SIGNIFICANT CHANGE UP (ref 0–0.2)
BASOPHILS NFR BLD AUTO: 0.2 % — SIGNIFICANT CHANGE UP (ref 0–2)
BLD GP AB SCN SERPL QL: NEGATIVE — SIGNIFICANT CHANGE UP
BUN SERPL-MCNC: 15 MG/DL — SIGNIFICANT CHANGE UP (ref 7–23)
BUN SERPL-MCNC: 16 MG/DL — SIGNIFICANT CHANGE UP (ref 7–23)
BUN SERPL-MCNC: 17 MG/DL — SIGNIFICANT CHANGE UP (ref 7–23)
CALCIUM SERPL-MCNC: 10 MG/DL — SIGNIFICANT CHANGE UP (ref 8.4–10.5)
CALCIUM SERPL-MCNC: 9.2 MG/DL — SIGNIFICANT CHANGE UP (ref 8.4–10.5)
CALCIUM SERPL-MCNC: 9.6 MG/DL — SIGNIFICANT CHANGE UP (ref 8.4–10.5)
CHLORIDE SERPL-SCNC: 115 MMOL/L — HIGH (ref 96–108)
CHLORIDE SERPL-SCNC: 118 MMOL/L — HIGH (ref 96–108)
CHLORIDE SERPL-SCNC: 119 MMOL/L — HIGH (ref 96–108)
CHOLEST SERPL-MCNC: 222 MG/DL — HIGH
CO2 BLDA-SCNC: 26 MMOL/L — HIGH (ref 19–24)
CO2 SERPL-SCNC: 20 MMOL/L — LOW (ref 22–31)
CO2 SERPL-SCNC: 21 MMOL/L — LOW (ref 22–31)
CO2 SERPL-SCNC: 22 MMOL/L — SIGNIFICANT CHANGE UP (ref 22–31)
CREAT SERPL-MCNC: 0.58 MG/DL — SIGNIFICANT CHANGE UP (ref 0.5–1.3)
CREAT SERPL-MCNC: 0.69 MG/DL — SIGNIFICANT CHANGE UP (ref 0.5–1.3)
CREAT SERPL-MCNC: 0.72 MG/DL — SIGNIFICANT CHANGE UP (ref 0.5–1.3)
EOSINOPHIL # BLD AUTO: 0.03 K/UL — SIGNIFICANT CHANGE UP (ref 0–0.5)
EOSINOPHIL NFR BLD AUTO: 0.2 % — SIGNIFICANT CHANGE UP (ref 0–6)
ESTIMATED AVERAGE GLUCOSE: 114 MG/DL — SIGNIFICANT CHANGE UP (ref 68–114)
GAS PNL BLDA: SIGNIFICANT CHANGE UP
GAS PNL BLDA: SIGNIFICANT CHANGE UP
GLUCOSE SERPL-MCNC: 103 MG/DL — HIGH (ref 70–99)
GLUCOSE SERPL-MCNC: 137 MG/DL — HIGH (ref 70–99)
GLUCOSE SERPL-MCNC: 146 MG/DL — HIGH (ref 70–99)
HCG SERPL-ACNC: <2 MIU/ML — SIGNIFICANT CHANGE UP
HCO3 BLDA-SCNC: 25 MMOL/L — SIGNIFICANT CHANGE UP (ref 21–28)
HCT VFR BLD CALC: 39.8 % — SIGNIFICANT CHANGE UP (ref 34.5–45)
HDLC SERPL-MCNC: 67 MG/DL — SIGNIFICANT CHANGE UP
HGB BLD-MCNC: 13 G/DL — SIGNIFICANT CHANGE UP (ref 11.5–15.5)
HOROWITZ INDEX BLDA+IHG-RTO: 40 — SIGNIFICANT CHANGE UP
IMM GRANULOCYTES NFR BLD AUTO: 0.4 % — SIGNIFICANT CHANGE UP (ref 0–1.5)
LIPID PNL WITH DIRECT LDL SERPL: 131 MG/DL — HIGH
LYMPHOCYTES # BLD AUTO: 1.17 K/UL — SIGNIFICANT CHANGE UP (ref 1–3.3)
LYMPHOCYTES # BLD AUTO: 8.8 % — LOW (ref 13–44)
MAGNESIUM SERPL-MCNC: 1.9 MG/DL — SIGNIFICANT CHANGE UP (ref 1.6–2.6)
MCHC RBC-ENTMCNC: 31.3 PG — SIGNIFICANT CHANGE UP (ref 27–34)
MCHC RBC-ENTMCNC: 32.7 GM/DL — SIGNIFICANT CHANGE UP (ref 32–36)
MCV RBC AUTO: 95.9 FL — SIGNIFICANT CHANGE UP (ref 80–100)
MONOCYTES # BLD AUTO: 1 K/UL — HIGH (ref 0–0.9)
MONOCYTES NFR BLD AUTO: 7.5 % — SIGNIFICANT CHANGE UP (ref 2–14)
NEUTROPHILS # BLD AUTO: 11.03 K/UL — HIGH (ref 1.8–7.4)
NEUTROPHILS NFR BLD AUTO: 82.9 % — HIGH (ref 43–77)
NON HDL CHOLESTEROL: 155 MG/DL — HIGH
NRBC # BLD: 0 /100 WBCS — SIGNIFICANT CHANGE UP (ref 0–0)
OSMOLALITY UR: 175 MOS/KG — LOW (ref 300–900)
PCO2 BLDA: 35 MMHG — SIGNIFICANT CHANGE UP (ref 32–45)
PH BLDA: 7.46 — HIGH (ref 7.35–7.45)
PHOSPHATE SERPL-MCNC: 3.6 MG/DL — SIGNIFICANT CHANGE UP (ref 2.5–4.5)
PLATELET # BLD AUTO: 131 K/UL — LOW (ref 150–400)
PO2 BLDA: 198 MMHG — HIGH (ref 83–108)
POTASSIUM SERPL-MCNC: 3.7 MMOL/L — SIGNIFICANT CHANGE UP (ref 3.5–5.3)
POTASSIUM SERPL-MCNC: 3.8 MMOL/L — SIGNIFICANT CHANGE UP (ref 3.5–5.3)
POTASSIUM SERPL-MCNC: 4 MMOL/L — SIGNIFICANT CHANGE UP (ref 3.5–5.3)
POTASSIUM SERPL-SCNC: 3.7 MMOL/L — SIGNIFICANT CHANGE UP (ref 3.5–5.3)
POTASSIUM SERPL-SCNC: 3.8 MMOL/L — SIGNIFICANT CHANGE UP (ref 3.5–5.3)
POTASSIUM SERPL-SCNC: 4 MMOL/L — SIGNIFICANT CHANGE UP (ref 3.5–5.3)
RBC # BLD: 4.15 M/UL — SIGNIFICANT CHANGE UP (ref 3.8–5.2)
RBC # FLD: 14.7 % — HIGH (ref 10.3–14.5)
RH IG SCN BLD-IMP: POSITIVE — SIGNIFICANT CHANGE UP
SAO2 % BLDA: 99.1 % — HIGH (ref 94–98)
SODIUM SERPL-SCNC: 149 MMOL/L — HIGH (ref 135–145)
SODIUM SERPL-SCNC: 151 MMOL/L — HIGH (ref 135–145)
SODIUM SERPL-SCNC: 153 MMOL/L — HIGH (ref 135–145)
SP GR UR STRIP: 1.01 — LOW (ref 1.01–1.02)
TRIGL SERPL-MCNC: 117 MG/DL — SIGNIFICANT CHANGE UP
TROPONIN T, HIGH SENSITIVITY RESULT: 206 NG/L — HIGH (ref 0–51)
WBC # BLD: 13.3 K/UL — HIGH (ref 3.8–10.5)
WBC # FLD AUTO: 13.3 K/UL — HIGH (ref 3.8–10.5)

## 2021-12-29 PROCEDURE — 83735 ASSAY OF MAGNESIUM: CPT

## 2021-12-29 PROCEDURE — 83605 ASSAY OF LACTIC ACID: CPT

## 2021-12-29 PROCEDURE — C8929: CPT

## 2021-12-29 PROCEDURE — 86900 BLOOD TYPING SEROLOGIC ABO: CPT

## 2021-12-29 PROCEDURE — 80053 COMPREHEN METABOLIC PANEL: CPT

## 2021-12-29 PROCEDURE — 99291 CRITICAL CARE FIRST HOUR: CPT

## 2021-12-29 PROCEDURE — 85610 PROTHROMBIN TIME: CPT

## 2021-12-29 PROCEDURE — 84132 ASSAY OF SERUM POTASSIUM: CPT

## 2021-12-29 PROCEDURE — 81005 URINALYSIS: CPT

## 2021-12-29 PROCEDURE — 82550 ASSAY OF CK (CPK): CPT

## 2021-12-29 PROCEDURE — 71045 X-RAY EXAM CHEST 1 VIEW: CPT | Mod: 26

## 2021-12-29 PROCEDURE — 85576 BLOOD PLATELET AGGREGATION: CPT

## 2021-12-29 PROCEDURE — 36415 COLL VENOUS BLD VENIPUNCTURE: CPT

## 2021-12-29 PROCEDURE — 82330 ASSAY OF CALCIUM: CPT

## 2021-12-29 PROCEDURE — 85025 COMPLETE CBC W/AUTO DIFF WBC: CPT

## 2021-12-29 PROCEDURE — 85730 THROMBOPLASTIN TIME PARTIAL: CPT

## 2021-12-29 PROCEDURE — 82803 BLOOD GASES ANY COMBINATION: CPT

## 2021-12-29 PROCEDURE — 94003 VENT MGMT INPAT SUBQ DAY: CPT

## 2021-12-29 PROCEDURE — 84484 ASSAY OF TROPONIN QUANT: CPT

## 2021-12-29 PROCEDURE — 93010 ELECTROCARDIOGRAM REPORT: CPT

## 2021-12-29 PROCEDURE — 84100 ASSAY OF PHOSPHORUS: CPT

## 2021-12-29 PROCEDURE — 85396 CLOTTING ASSAY WHOLE BLOOD: CPT

## 2021-12-29 PROCEDURE — 85018 HEMOGLOBIN: CPT

## 2021-12-29 PROCEDURE — 84295 ASSAY OF SERUM SODIUM: CPT

## 2021-12-29 PROCEDURE — 80048 BASIC METABOLIC PNL TOTAL CA: CPT

## 2021-12-29 PROCEDURE — 71045 X-RAY EXAM CHEST 1 VIEW: CPT

## 2021-12-29 PROCEDURE — 83036 HEMOGLOBIN GLYCOSYLATED A1C: CPT

## 2021-12-29 PROCEDURE — 93306 TTE W/DOPPLER COMPLETE: CPT | Mod: 26

## 2021-12-29 PROCEDURE — 80061 LIPID PANEL: CPT

## 2021-12-29 PROCEDURE — 86901 BLOOD TYPING SEROLOGIC RH(D): CPT

## 2021-12-29 PROCEDURE — 84702 CHORIONIC GONADOTROPIN TEST: CPT

## 2021-12-29 PROCEDURE — 83935 ASSAY OF URINE OSMOLALITY: CPT

## 2021-12-29 PROCEDURE — 94002 VENT MGMT INPAT INIT DAY: CPT

## 2021-12-29 PROCEDURE — 85014 HEMATOCRIT: CPT

## 2021-12-29 PROCEDURE — 93005 ELECTROCARDIOGRAM TRACING: CPT

## 2021-12-29 PROCEDURE — 96374 THER/PROPH/DIAG INJ IV PUSH: CPT

## 2021-12-29 PROCEDURE — 99285 EMERGENCY DEPT VISIT HI MDM: CPT

## 2021-12-29 PROCEDURE — 86850 RBC ANTIBODY SCREEN: CPT

## 2021-12-29 PROCEDURE — 70450 CT HEAD/BRAIN W/O DYE: CPT | Mod: MA

## 2021-12-29 PROCEDURE — 82947 ASSAY GLUCOSE BLOOD QUANT: CPT

## 2021-12-29 PROCEDURE — 82435 ASSAY OF BLOOD CHLORIDE: CPT

## 2021-12-29 RX ORDER — VASOPRESSIN 20 [USP'U]/ML
0.5 INJECTION INTRAVENOUS
Qty: 50 | Refills: 0 | Status: DISCONTINUED | OUTPATIENT
Start: 2021-12-29 | End: 2021-12-29

## 2021-12-29 RX ORDER — POTASSIUM CHLORIDE 20 MEQ
20 PACKET (EA) ORAL ONCE
Refills: 0 | Status: COMPLETED | OUTPATIENT
Start: 2021-12-29 | End: 2021-12-29

## 2021-12-29 RX ORDER — PIPERACILLIN AND TAZOBACTAM 4; .5 G/20ML; G/20ML
3.38 INJECTION, POWDER, LYOPHILIZED, FOR SOLUTION INTRAVENOUS ONCE
Refills: 0 | Status: COMPLETED | OUTPATIENT
Start: 2021-12-29 | End: 2021-12-29

## 2021-12-29 RX ORDER — LEVOTHYROXINE SODIUM 125 MCG
20 TABLET ORAL ONCE
Refills: 0 | Status: DISCONTINUED | OUTPATIENT
Start: 2021-12-29 | End: 2021-12-29

## 2021-12-29 RX ORDER — DESMOPRESSIN ACETATE 0.1 MG/1
0.5 TABLET ORAL ONCE
Refills: 0 | Status: COMPLETED | OUTPATIENT
Start: 2021-12-29 | End: 2021-12-29

## 2021-12-29 RX ORDER — POTASSIUM CHLORIDE 20 MEQ
40 PACKET (EA) ORAL ONCE
Refills: 0 | Status: DISCONTINUED | OUTPATIENT
Start: 2021-12-29 | End: 2021-12-29

## 2021-12-29 RX ORDER — VASOPRESSIN 20 [USP'U]/ML
1 INJECTION INTRAVENOUS ONCE
Refills: 0 | Status: DISCONTINUED | OUTPATIENT
Start: 2021-12-29 | End: 2021-12-29

## 2021-12-29 RX ORDER — LEVOTHYROXINE SODIUM 125 MCG
10 TABLET ORAL
Qty: 200 | Refills: 0 | Status: DISCONTINUED | OUTPATIENT
Start: 2021-12-29 | End: 2021-12-29

## 2021-12-29 RX ORDER — VASOPRESSIN 20 [USP'U]/ML
0.01 INJECTION INTRAVENOUS
Qty: 50 | Refills: 0 | Status: DISCONTINUED | OUTPATIENT
Start: 2021-12-29 | End: 2021-12-29

## 2021-12-29 RX ORDER — CHLORHEXIDINE GLUCONATE 213 G/1000ML
1 SOLUTION TOPICAL
Refills: 0 | Status: DISCONTINUED | OUTPATIENT
Start: 2021-12-29 | End: 2021-12-29

## 2021-12-29 RX ORDER — LEVETIRACETAM 250 MG/1
500 TABLET, FILM COATED ORAL
Refills: 0 | Status: DISCONTINUED | OUTPATIENT
Start: 2021-12-29 | End: 2021-12-29

## 2021-12-29 RX ORDER — VANCOMYCIN HCL 1 G
1000 VIAL (EA) INTRAVENOUS EVERY 24 HOURS
Refills: 0 | Status: DISCONTINUED | OUTPATIENT
Start: 2021-12-29 | End: 2021-12-29

## 2021-12-29 RX ORDER — LABETALOL HCL 100 MG
10 TABLET ORAL ONCE
Refills: 0 | Status: DISCONTINUED | OUTPATIENT
Start: 2021-12-29 | End: 2021-12-29

## 2021-12-29 RX ORDER — SODIUM CHLORIDE 9 MG/ML
1000 INJECTION INTRAMUSCULAR; INTRAVENOUS; SUBCUTANEOUS ONCE
Refills: 0 | Status: COMPLETED | OUTPATIENT
Start: 2021-12-29 | End: 2021-12-29

## 2021-12-29 RX ORDER — PIPERACILLIN AND TAZOBACTAM 4; .5 G/20ML; G/20ML
3.38 INJECTION, POWDER, LYOPHILIZED, FOR SOLUTION INTRAVENOUS EVERY 8 HOURS
Refills: 0 | Status: DISCONTINUED | OUTPATIENT
Start: 2021-12-30 | End: 2021-12-29

## 2021-12-29 RX ORDER — VANCOMYCIN HCL 1 G
1000 VIAL (EA) INTRAVENOUS ONCE
Refills: 0 | Status: DISCONTINUED | OUTPATIENT
Start: 2021-12-29 | End: 2021-12-29

## 2021-12-29 RX ORDER — LABETALOL HCL 100 MG
5 TABLET ORAL ONCE
Refills: 0 | Status: COMPLETED | OUTPATIENT
Start: 2021-12-29 | End: 2021-12-29

## 2021-12-29 RX ADMIN — PIPERACILLIN AND TAZOBACTAM 200 GRAM(S): 4; .5 INJECTION, POWDER, LYOPHILIZED, FOR SOLUTION INTRAVENOUS at 20:20

## 2021-12-29 RX ADMIN — LEVETIRACETAM 400 MILLIGRAM(S): 250 TABLET, FILM COATED ORAL at 05:12

## 2021-12-29 RX ADMIN — CHLORHEXIDINE GLUCONATE 15 MILLILITER(S): 213 SOLUTION TOPICAL at 05:12

## 2021-12-29 RX ADMIN — CHLORHEXIDINE GLUCONATE 15 MILLILITER(S): 213 SOLUTION TOPICAL at 17:01

## 2021-12-29 RX ADMIN — DESMOPRESSIN ACETATE 0.5 MICROGRAM(S): 0.1 TABLET ORAL at 01:55

## 2021-12-29 RX ADMIN — VASOPRESSIN 0.6 UNIT(S)/MIN: 20 INJECTION INTRAVENOUS at 07:01

## 2021-12-29 RX ADMIN — LEVETIRACETAM 500 MILLIGRAM(S): 250 TABLET, FILM COATED ORAL at 17:00

## 2021-12-29 RX ADMIN — Medication 20 MILLIEQUIVALENT(S): at 07:01

## 2021-12-29 RX ADMIN — Medication 116 MILLIGRAM(S): at 21:02

## 2021-12-29 RX ADMIN — SODIUM CHLORIDE 1000 MILLILITER(S): 9 INJECTION INTRAMUSCULAR; INTRAVENOUS; SUBCUTANEOUS at 02:10

## 2021-12-29 RX ADMIN — Medication 5 MILLIGRAM(S): at 18:53

## 2021-12-29 NOTE — DISCHARGE NOTE FOR THE EXPIRED PATIENT - HOSPITAL COURSE
A 89 year old female with unknown history of AC/AP was transferred from OSH after found down in street for unknown period of time. GCS7, then patient was intubated for airway. Reportedly poor exam pre-intubation. After intubation, patient was reportedly localized LUE otherwise no movement. Patient got mannitol and AED at OSH. CTH at OSH showed large Left IPH with significant MLS and mass effect. Patient was admitted to [facility] on 12/28/2021. Upon admission, patient was made DNR per family discussion. Vasopressors were started for hypotension. Brain death exam performed on 12/29/2021. Based on apnea test, patient was pronounced brain death at 1803 on 12/29/2021. Ramiro MATHEW was following patient afterwards.

## 2021-12-29 NOTE — PROGRESS NOTE ADULT - ASSESSMENT
Summary:   89F unknown AC/AP xfer FH found down in street for unknown period of time. GCS7, then intubated for airway. Reportedly poor exam pre-intubation. After int, reportedly localized LUE otherwise no mvmt. Getting mannitol, AED at . Unable to contact family. CTH: large L FP IPH w/ sig MLS and mass effect.    NEURO:    q1h neuro checks  Neck collar   Head elevation 30 degrees   Keppra 500 BID    CARDS:    -140  Nicardipine gtt     PULM:    PRVC  450/12/5/40  sat > 92%    RENAL:   BMP  NS 75 ml/hr     GASTRO:    NPO  Senna    HEME:    DVT prophylaxis: SCDs,   hold anticoagulation    ENDO:    BG goal 140-180    ID:    Monitor for fever     Social :  Family meeting regarding goals of care     Code status:  DNR  Disposition:  ICU   Summary:   89F unknown AC/AP xfer FH found down in street for unknown period of time. GCS7, then intubated for airway. Reportedly poor exam pre-intubation. After int, reportedly localized LUE otherwise no mvmt. Getting mannitol, AED at . Unable to contact family. CTH: large L FP IPH w/ sig MLS and mass effect.    NEURO:   Brain death exam   q1h neuro checks  Neck collar   Organ donation called  Head elevation 30 degrees   Keppra 500 BID    CARDS:    -140  Wean neosynephrine    PULM:    PRVC  400/12/5/40  sat > 92%  Apnea teat    RENAL: DI  DDAVP- 0.5 mcg DDAVP SQx1   BMP at 12 noon   IVL       GASTRO:    NPO  Senna    HEME:    DVT prophylaxis: SCDs,   hold anticoagulation    ENDO:    BG goal 140-180    ID:    Monitor for fever     Social :  Family was called - requested DNR/ DNI- will be coming from out of town today   Health Care agent is son Jose Powell     Code status:  DNR/DNI  Disposition:  ICU

## 2021-12-29 NOTE — CHART NOTE - NSCHARTNOTEFT_GEN_A_CORE
Order for speech language evaluation received and appreciated. [facility], pt not medically appropriate for evaluation. This service to sign off.

## 2021-12-29 NOTE — PROVIDER CONTACT NOTE (OTHER) - ACTION/TREATMENT ORDERED:
Give atropine as per MD Shaquille Fleming.
As per KATLYN Kiran, notify Live on Representative Ellyn.   As per Ellyn, do not give.

## 2021-12-29 NOTE — PROGRESS NOTE ADULT - SUBJECTIVE AND OBJECTIVE BOX
HPI:  89 F unknown AC/AP xfer  found down in street for unknown period of time. GCS7, then intubated for airway. Reportedly poor exam pre-intubation. After int, reportedly localized LUE otherwise no mvmt. Getting mannitol, AED at . Unable to contact family. CTH: large L FP IPH w/ sig MLS and mass effect.  OVERNIGHT EVENTS:   No acute events overnight.    VITALS:  T(C): , Max: 38 (12-28-21 @ 20:00)  HR:  (57 - 160)  BP:  (63/45 - 213/126)  ABP: --  RR:  (12 - 23)  SpO2:  (97% - 100%)  Wt(kg): --  Device: Avea, Mode: AC/ CMV (Assist Control/ Continuous Mandatory Ventilation), RR (machine): 12, TV (machine): 450, FiO2: 40, PEEP: 5, ITime: 1, MAP: 9, PIP: 22    12-28-21 @ 07:01  -  12-29-21 @ 06:57  --------------------------------------------------------  IN: 2604.8 mL / OUT: 3575 mL / NET: -970.2 mL      LABS:  Na: 153 (12-29 @ 05:59), 147 (12-28 @ 23:22), 138 (12-28 @ 15:53), 140 (12-28 @ 12:28)  K: 3.8 (12-29 @ 05:59), 4.8 (12-28 @ 23:22), 2.9 (12-28 @ 15:53), 3.5 (12-28 @ 12:28)  Cl: 118 (12-29 @ 05:59), 111 (12-28 @ 23:22), 96 (12-28 @ 15:53), 103 (12-28 @ 12:28)  CO2: 21 (12-29 @ 05:59), 23 (12-28 @ 23:22), 21 (12-28 @ 15:53), 27 (12-28 @ 12:28)  BUN: 15 (12-29 @ 05:59), 17 (12-28 @ 23:22), 23 (12-28 @ 15:53), 26 (12-28 @ 12:28)  Cr: 0.58 (12-29 @ 05:59), 0.67 (12-28 @ 23:22), 0.71 (12-28 @ 15:53), 0.81 (12-28 @ 12:28)  Glu: 146(12-29 @ 05:59), 123(12-28 @ 23:22), 199(12-28 @ 15:53), 132(12-28 @ 12:28)    Hgb: 13.1 (12-28 @ 15:53), 13.0 (12-28 @ 12:28)  Hct: 39.0 (12-28 @ 15:53), 39.0 (12-28 @ 12:28)  WBC: 11.42 (12-28 @ 15:53), 5.18 (12-28 @ 12:28)  Plt: 228 (12-28 @ 15:53), 201 (12-28 @ 12:28)    INR: 0.95 12-28-21 @ 15:53, 0.96 12-28-21 @ 12:28  PTT: 27.5 12-28-21 @ 15:53, 27.2 12-28-21 @ 12:28    IMAGING:   Recent imaging studies were reviewed.    MEDICATIONS:  chlorhexidine 0.12% Liquid 15 milliLiter(s) Oral Mucosa every 12 hours  chlorhexidine 4% Liquid 1 Application(s) Topical <User Schedule>  levETIRAcetam  IVPB 500 milliGRAM(s) IV Intermittent every 12 hours  phenylephrine    Infusion 0.1 MICROgram(s)/kG/Min IV Continuous <Continuous>  potassium chloride   Solution 20 milliEquivalent(s) Enteral Tube once  senna 2 Tablet(s) Oral at bedtime PRN  vasopressin Infusion 0.01 Unit(s)/Min IV Continuous <Continuous>    EXAMINATION:  General:  intubated   HEENT:  MMM  Neuro:  GCS E1M2VT, pupils right 4 mm, left 7 mm both non reactive, corneal at left only, no cough, positive gag, extending with the left UL, flaccid right UL, triple flexion both LL  Cards:  RRR, no murmurs   Respiratory:  Intubated, Mechanical mary grace  Adomen:  soft  Extremities:  no edema  Skin:  warm/dry   HPI:  89 F unknown AC/AP xfer  found down in street for unknown period of time. GCS7, then intubated for airway. Reportedly poor exam pre-intubation. After int, reportedly localized LUE otherwise no mvmt. Getting mannitol, AED at . Unable to contact family. CTH: large L FP IPH w/ sig MLS and mass effect.  OVERNIGHT EVENTS:   Cushing response ; Intracranial Hypertension ; placed on pressors and hypothermia    VITALS:  ICU Vital Signs Last 24 Hrs  T(C): 36.3 (29 Dec 2021 06:00), Max: 38 (28 Dec 2021 20:00)  T(F): 97.3 (29 Dec 2021 06:00), Max: 100.4 (28 Dec 2021 20:00)  HR: 57 (29 Dec 2021 06:45) (57 - 160)  BP: 114/57 (29 Dec 2021 06:45) (63/45 - 213/126)  BP(mean): 73 (29 Dec 2021 06:45) (46 - 152)  RR: 14 (29 Dec 2021 06:45) (12 - 23)  SpO2: 100% (29 Dec 2021 06:45) (97% - 100%)        28 Dec 2021 07:01  -  29 Dec 2021 07:00  --------------------------------------------------------  IN:    Free Water: 300 mL    IV PiggyBack: 400 mL    NiCARdipine: 65 mL    Phenylephrine: 158.6 mL    Sodium Chloride 0.9% Bolus: 2000 mL  Total IN: 2923.6 mL    OUT:    Indwelling Catheter - Urethral (mL): 3575 mL    Propofol: 0 mL    Stool (mL): 0 mL  Total OUT: 3575 mL    Total NET: -651.4 mL            LABS:  Na: 153 (12-29 @ 05:59), 147 (12-28 @ 23:22), 138 (12-28 @ 15:53), 140 (12-28 @ 12:28)  K: 3.8 (12-29 @ 05:59), 4.8 (12-28 @ 23:22), 2.9 (12-28 @ 15:53), 3.5 (12-28 @ 12:28)  Cl: 118 (12-29 @ 05:59), 111 (12-28 @ 23:22), 96 (12-28 @ 15:53), 103 (12-28 @ 12:28)  CO2: 21 (12-29 @ 05:59), 23 (12-28 @ 23:22), 21 (12-28 @ 15:53), 27 (12-28 @ 12:28)  BUN: 15 (12-29 @ 05:59), 17 (12-28 @ 23:22), 23 (12-28 @ 15:53), 26 (12-28 @ 12:28)  Cr: 0.58 (12-29 @ 05:59), 0.67 (12-28 @ 23:22), 0.71 (12-28 @ 15:53), 0.81 (12-28 @ 12:28)  Glu: 146(12-29 @ 05:59), 123(12-28 @ 23:22), 199(12-28 @ 15:53), 132(12-28 @ 12:28)    Hgb: 13.1 (12-28 @ 15:53), 13.0 (12-28 @ 12:28)  Hct: 39.0 (12-28 @ 15:53), 39.0 (12-28 @ 12:28)  WBC: 11.42 (12-28 @ 15:53), 5.18 (12-28 @ 12:28)  Plt: 228 (12-28 @ 15:53), 201 (12-28 @ 12:28)    INR: 0.95 12-28-21 @ 15:53, 0.96 12-28-21 @ 12:28  PTT: 27.5 12-28-21 @ 15:53, 27.2 12-28-21 @ 12:28    P2Y12- 232  ARU- 642    IMAGING:    CT Head No Cont (12.28.21 @ 16:40) >    IMPRESSION:   marked increase in the size of the acute LEFT hemispheric   intraparenchymal hemorrhage now measuring at least 10.3 x 8.0 cm   resulting in marked effacement of the LEFT ventricle and 3 cm subfalcine   herniation to the RIGHT. There is entrapment of the RIGHT ventricle.   There is LEFT uncal herniation and transtentorial herniation. Small   pontine hemorrhage is now noted.    Critical value:  I discussed the finding of this report with KATLYN Singer at   4:45 PM on 12/20/2021.  Critical value policy of the hospital was   followed.  Read back and confirmation of receipt of this communication   was performed.  This verbal communication supplements the text report of   this document.    < end of copied text >      MEDICATIONS:  chlorhexidine 0.12% Liquid 15 milliLiter(s) Oral Mucosa every 12 hours  chlorhexidine 4% Liquid 1 Application(s) Topical <User Schedule>  levETIRAcetam  IVPB 500 milliGRAM(s) IV Intermittent every 12 hours  phenylephrine    Infusion 0.1 MICROgram(s)/kG/Min IV Continuous <Continuous>  potassium chloride   Solution 20 milliEquivalent(s) Enteral Tube once  senna 2 Tablet(s) Oral at bedtime PRN  vasopressin Infusion 0.01 Unit(s)/Min IV Continuous <Continuous>    EXAMINATION:  General:  intubated   HEENT:  MMM  Neuro:  GCS E1M2VT, pupils right 4 mm, left 7 mm both non reactive, corneal - none, no cough, no gag, flaccid to noxious injury, Neg cold caloric - no reaction   Cards:  RRR, no murmurs   Respiratory:  Intubated, Mechanical mary grace  Adomen:  soft  Extremities:  no edema  Skin:  warm/dry

## 2021-12-30 LAB
A1C WITH ESTIMATED AVERAGE GLUCOSE RESULT: 5.6 % — SIGNIFICANT CHANGE UP (ref 4–5.6)
ESTIMATED AVERAGE GLUCOSE: 114 MG/DL — SIGNIFICANT CHANGE UP (ref 68–114)

## 2022-10-17 NOTE — PATIENT PROFILE ADULT - NSPROPTRIGHTNOTIFYOBTAINDET_GEN_A_NUR
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 Patient complaining of abdominal pain this morning, but no nausea/vomiting.  LUE AVG clotted. 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      ESRD on hemodialysis      History of ectopic pregnancy  Two      H/O: hysterectomy  Due to cervical cancer      S/P arteriovenous (AV) fistula creation  july 10 2020          oxycodone (Rash)  penicillins (Urticaria; Rash)      MEDICATIONS  (STANDING):  allopurinol 100 milliGRAM(s) Oral daily  amLODIPine   Tablet 10 milliGRAM(s) Oral daily  atorvastatin 80 milliGRAM(s) Oral at bedtime  calcitriol   Capsule 0.25 MICROGram(s) Oral daily  cefTRIAXone   IVPB 1000 milliGRAM(s) IV Intermittent every 24 hours  chlorhexidine 2% Cloths 1 Application(s) Topical daily  chlorhexidine 2% Cloths 1 Application(s) Topical <User Schedule>  citalopram 20 milliGRAM(s) Oral daily  darunavir 800 mG/cobicstat 150 mG 1 Tablet(s) Oral daily  dolutegravir 50 milliGRAM(s) Oral daily  epoetin neyda-epbx (RETACRIT) Injectable 8000 Unit(s) IV Push <User Schedule>  gabapentin 100 milliGRAM(s) Oral at bedtime  heparin   Injectable 5000 Unit(s) SubCutaneous every 8 hours  hydrALAZINE 50 milliGRAM(s) Oral three times a day  isosorbide   mononitrate ER Tablet (IMDUR) 60 milliGRAM(s) Oral daily  lamiVUDine- milliGRAM(s) Oral daily  lidocaine/prilocaine Cream 1 Application(s) Topical <User Schedule>  melatonin 3 milliGRAM(s) Oral at bedtime  metoprolol tartrate 100 milliGRAM(s) Oral two times a day  metroNIDAZOLE  IVPB 500 milliGRAM(s) IV Intermittent every 8 hours  Nephro-alex 1 Tablet(s) Oral daily  omega-3-Acid Ethyl Esters 1 Gram(s) Oral daily  pantoprazole  Injectable 40 milliGRAM(s) IV Push every 12 hours  senna 2 Tablet(s) Oral at bedtime  zinc sulfate 220 milliGRAM(s) Oral daily    MEDICATIONS  (PRN):  acetaminophen     Tablet .. 1000 milliGRAM(s) Oral every 8 hours PRN Moderate Pain (4 - 6)  aluminum hydroxide/magnesium hydroxide/simethicone Suspension 30 milliLiter(s) Oral every 4 hours PRN Dyspepsia  HYDROmorphone   Tablet 2 milliGRAM(s) Oral every 4 hours PRN Severe Pain (7 - 10)  ondansetron Injectable 4 milliGRAM(s) IV Push every 8 hours PRN Nausea and/or Vomiting  sodium chloride 0.9% lock flush 10 milliLiter(s) IV Push every 1 hour PRN Pre/post blood products, medications, blood draw, and to maintain line patency                            10.2   8.24  )-----------( 242      ( 17 Oct 2022 10:25 )             30.7     10-17    134<L>  |  102  |  50<H>  ----------------------------<  96  4.3   |  23  |  8.74<H>    Ca    9.2      17 Oct 2022 10:25  Phos  3.2     10-17  Mg     1.9     10-17              REVIEW OF SYSTEMS:  General: no fever no chills, no weight loss.  EYES/ENT: No visual changes;  No vertigo, no headache.  NECK: No pain or stiffness  RESPIRATORY: No cough, wheezing, hemoptysis; No shortness of breath  CARDIOVASCULAR: No chest pain or palpitations. No Edema  GASTROINTESTINAL: No abdominal or epigastric pain. No nausea, vomiting. Left colostomy.  GENITOURINARY: No dysuria, frequency, foamy urine, urinary urgency, incontinence or hematuria  NEUROLOGICAL: No numbness or weakness, no tremor , no dizziness.   Muscle skeletal : no joint pain and no swelling of joints and limbs.  SKIN: No itching, burning, rashes.        VITALS:  T(F): 97.2 (10-17-22 @ 12:20), Max: 98.6 (10-16-22 @ 14:19)  HR: 68 (10-17-22 @ 12:20)  BP: 115/70 (10-17-22 @ 12:20)  RR: 16 (10-17-22 @ 12:20)  SpO2: 100% (10-17-22 @ 12:20)  Wt(kg): --      PHYSICAL EXAM:  Constitutional: well developed, no diaphoresis, no distress.  Neck: No JVD, no carotid bruit, supple, no adenopathy  Respiratory: Good air entrance B/L, no wheezes, rales or rhonchi  Cardiovascular: S1, S2, RRR, no pericardial rub, no murmur  Abdomen: BS+, soft, no tenderness, no bruit, left colostomy with stool dark brown  Pelvis: bladder nondistended  Extremities: No cyanosis or clubbing. No peripheral edema.       Vascular Access: left AVG clotted  Right PC with no discharge, no erythema .      unable to assess due to neuro status , no family at bedside

## 2023-05-16 NOTE — PATIENT PROFILE ADULT - FUNCTIONAL ASSESSMENT - BASIC MOBILITY 2.
1 = Total assistance Cosentyx Counseling:  I discussed with the patient the risks of Cosentyx including but not limited to worsening of Crohn's disease, immunosuppression, allergic reactions and infections.  The patient understands that monitoring is required including a PPD at baseline and must alert us or the primary physician if symptoms of infection or other concerning signs are noted. Solaraze Pregnancy And Lactation Text: This medication is Pregnancy Category B and is considered safe. There is some data to suggest avoiding during the third trimester. It is unknown if this medication is excreted in breast milk. Azathioprine Pregnancy And Lactation Text: This medication is Pregnancy Category D and isn't considered safe during pregnancy. It is unknown if this medication is excreted in breast milk. Cimzia Counseling:  I discussed with the patient the risks of Cimzia including but not limited to immunosuppression, allergic reactions and infections.  The patient understands that monitoring is required including a PPD at baseline and must alert us or the primary physician if symptoms of infection or other concerning signs are noted. Clindamycin Pregnancy And Lactation Text: This medication can be used in pregnancy if certain situations. Clindamycin is also present in breast milk. Dapsone Counseling: I discussed with the patient the risks of dapsone including but not limited to hemolytic anemia, agranulocytosis, rashes, methemoglobinemia, kidney failure, peripheral neuropathy, headaches, GI upset, and liver toxicity.  Patients who start dapsone require monitoring including baseline LFTs and weekly CBCs for the first month, then every month thereafter.  The patient verbalized understanding of the proper use and possible adverse effects of dapsone.  All of the patient's questions and concerns were addressed. Drysol Counseling:  I discussed with the patient the risks of drysol/aluminum chloride including but not limited to skin rash, itching, irritation, burning. Topical Retinoid counseling:  Patient advised to apply a pea-sized amount only at bedtime and wait 30 minutes after washing their face before applying.  If too drying, patient may add a non-comedogenic moisturizer. The patient verbalized understanding of the proper use and possible adverse effects of retinoids.  All of the patient's questions and concerns were addressed. Oral Minoxidil Pregnancy And Lactation Text: This medication should only be used when clearly needed if you are pregnant, attempting to become pregnant or breast feeding. Niacinamide Pregnancy And Lactation Text: These medications are considered safe during pregnancy. Isotretinoin Counseling: Patient should get monthly blood tests, not donate blood, not drive at night if vision affected, not share medication, and not undergo elective surgery for 6 months after tx completed. Side effects reviewed, pt to contact office should one occur. Hydroquinone Pregnancy And Lactation Text: This medication has not been assigned a Pregnancy Risk Category but animal studies failed to show danger with the topical medication. It is unknown if the medication is excreted in breast milk. Erythromycin Pregnancy And Lactation Text: This medication is Pregnancy Category B and is considered safe during pregnancy. It is also excreted in breast milk. Fluconazole Pregnancy And Lactation Text: This medication is Pregnancy Category C and it isn't know if it is safe during pregnancy. It is also excreted in breast milk. Otezla Counseling: The side effects of Otezla were discussed with the patient, including but not limited to worsening or new depression, weight loss, diarrhea, nausea, upper respiratory tract infection, and headache. Patient instructed to call the office should any adverse effect occur.  The patient verbalized understanding of the proper use and possible adverse effects of Otezla.  All the patient's questions and concerns were addressed. Nsaids Counseling: NSAID Counseling: I discussed with the patient that NSAIDs should be taken with food. Prolonged use of NSAIDs can result in the development of stomach ulcers.  Patient advised to stop taking NSAIDs if abdominal pain occurs.  The patient verbalized understanding of the proper use and possible adverse effects of NSAIDs.  All of the patient's questions and concerns were addressed. Erivedge Counseling- I discussed with the patient the risks of Erivedge including but not limited to nausea, vomiting, diarrhea, constipation, weight loss, changes in the sense of taste, decreased appetite, muscle spasms, and hair loss.  The patient verbalized understanding of the proper use and possible adverse effects of Erivedge.  All of the patient's questions and concerns were addressed. Opioid Counseling: I discussed with the patient the potential side effects of opioids including but not limited to addiction, altered mental status, and depression. I stressed avoiding alcohol, benzodiazepines, muscle relaxants and sleep aids unless specifically okayed by a physician. The patient verbalized understanding of the proper use and possible adverse effects of opioids. All of the patient's questions and concerns were addressed. They were instructed to flush the remaining pills down the toilet if they did not need them for pain. Isotretinoin Pregnancy And Lactation Text: This medication is Pregnancy Category X and is considered extremely dangerous during pregnancy. It is unknown if it is excreted in breast milk. Topical Sulfur Applications Counseling: Topical Sulfur Counseling: Patient counseled that this medication may cause skin irritation or allergic reactions.  In the event of skin irritation, the patient was advised to reduce the amount of the drug applied or use it less frequently.   The patient verbalized understanding of the proper use and possible adverse effects of topical sulfur application.  All of the patient's questions and concerns were addressed. Tranexamic Acid Pregnancy And Lactation Text: It is unknown if this medication is safe during pregnancy or breast feeding. Use Enhanced Medication Counseling?: No Tremfya Pregnancy And Lactation Text: The risk during pregnancy and breastfeeding is uncertain with this medication. Rifampin Pregnancy And Lactation Text: This medication is Pregnancy Category C and it isn't know if it is safe during pregnancy. It is also excreted in breast milk and should not be used if you are breast feeding. Opioid Pregnancy And Lactation Text: These medications can lead to premature delivery and should be avoided during pregnancy. These medications are also present in breast milk in small amounts. Rhofade Counseling: Rhofade is a topical medication which can decrease superficial blood flow where applied. Side effects are uncommon and include stinging, redness and allergic reactions. Cimetidine Counseling:  I discussed with the patient the risks of Cimetidine including but not limited to gynecomastia, headache, diarrhea, nausea, drowsiness, arrhythmias, pancreatitis, skin rashes, psychosis, bone marrow suppression and kidney toxicity. Acitretin Pregnancy And Lactation Text: This medication is Pregnancy Category X and should not be given to women who are pregnant or may become pregnant in the future. This medication is excreted in breast milk. Glycopyrrolate Counseling:  I discussed with the patient the risks of glycopyrrolate including but not limited to skin rash, drowsiness, dry mouth, difficulty urinating, and blurred vision. Odomzo Counseling- I discussed with the patient the risks of Odomzo including but not limited to nausea, vomiting, diarrhea, constipation, weight loss, changes in the sense of taste, decreased appetite, muscle spasms, and hair loss.  The patient verbalized understanding of the proper use and possible adverse effects of Odomzo.  All of the patient's questions and concerns were addressed. Oxybutynin Counseling:  I discussed with the patient the risks of oxybutynin including but not limited to skin rash, drowsiness, dry mouth, difficulty urinating, and blurred vision. Libtayo Counseling- I discussed with the patient the risks of Libtayo including but not limited to nausea, vomiting, diarrhea, and bone or muscle pain.  The patient verbalized understanding of the proper use and possible adverse effects of Libtayo.  All of the patient's questions and concerns were addressed. Wartpeel Pregnancy And Lactation Text: This medication is Pregnancy Category X and contraindicated in pregnancy and in women who may become pregnant. It is unknown if this medication is excreted in breast milk. Imiquimod Counseling:  I discussed with the patient the risks of imiquimod including but not limited to erythema, scaling, itching, weeping, crusting, and pain.  Patient understands that the inflammatory response to imiquimod is variable from person to person and was educated regarded proper titration schedule.  If flu-like symptoms develop, patient knows to discontinue the medication and contact us. Tranexamic Acid Counseling:  Patient advised of the small risk of bleeding problems with tranexamic acid. They were also instructed to call if they developed any nausea, vomiting or diarrhea. All of the patient's questions and concerns were addressed. Sski Pregnancy And Lactation Text: This medication is Pregnancy Category D and isn't considered safe during pregnancy. It is excreted in breast milk. Doxycycline Pregnancy And Lactation Text: This medication is Pregnancy Category D and not consider safe during pregnancy. It is also excreted in breast milk but is considered safe for shorter treatment courses. Clindamycin Counseling: I counseled the patient regarding use of clindamycin as an antibiotic for prophylactic and/or therapeutic purposes. Clindamycin is active against numerous classes of bacteria, including skin bacteria. Side effects may include nausea, diarrhea, gastrointestinal upset, rash, hives, yeast infections, and in rare cases, colitis. High Dose Vitamin A Pregnancy And Lactation Text: High dose vitamin A therapy is contraindicated during pregnancy and breast feeding. Cyclophosphamide Pregnancy And Lactation Text: This medication is Pregnancy Category D and it isn't considered safe during pregnancy. This medication is excreted in breast milk. Prednisone Counseling:  I discussed with the patient the risks of prolonged use of prednisone including but not limited to weight gain, insomnia, osteoporosis, mood changes, diabetes, susceptibility to infection, glaucoma and high blood pressure.  In cases where prednisone use is prolonged, patients should be monitored with blood pressure checks, serum glucose levels and an eye exam.  Additionally, the patient may need to be placed on GI prophylaxis, PCP prophylaxis, and calcium and vitamin D supplementation and/or a bisphosphonate.  The patient verbalized understanding of the proper use and the possible adverse effects of prednisone.  All of the patient's questions and concerns were addressed. Cyclophosphamide Counseling:  I discussed with the patient the risks of cyclophosphamide including but not limited to hair loss, hormonal abnormalities, decreased fertility, abdominal pain, diarrhea, nausea and vomiting, bone marrow suppression and infection. The patient understands that monitoring is required while taking this medication. Hydroquinone Counseling:  Patient advised that medication may result in skin irritation, lightening (hypopigmentation), dryness, and burning.  In the event of skin irritation, the patient was advised to reduce the amount of the drug applied or use it less frequently.  Rarely, spots that are treated with hydroquinone can become darker (pseudoochronosis).  Should this occur, patient instructed to stop medication and call the office. The patient verbalized understanding of the proper use and possible adverse effects of hydroquinone.  All of the patient's questions and concerns were addressed. Dapsone Pregnancy And Lactation Text: This medication is Pregnancy Category C and is not considered safe during pregnancy or breast feeding. Minocycline Counseling: Patient advised regarding possible photosensitivity and discoloration of the teeth, skin, lips, tongue and gums.  Patient instructed to avoid sunlight, if possible.  When exposed to sunlight, patients should wear protective clothing, sunglasses, and sunscreen.  The patient was instructed to call the office immediately if the following severe adverse effects occur:  hearing changes, easy bruising/bleeding, severe headache, or vision changes.  The patient verbalized understanding of the proper use and possible adverse effects of minocycline.  All of the patient's questions and concerns were addressed. Benzoyl Peroxide Counseling: Patient counseled that medicine may cause skin irritation and bleach clothing.  In the event of skin irritation, the patient was advised to reduce the amount of the drug applied or use it less frequently.   The patient verbalized understanding of the proper use and possible adverse effects of benzoyl peroxide.  All of the patient's questions and concerns were addressed. Ilumya Counseling: I discussed with the patient the risks of tildrakizumab including but not limited to immunosuppression, malignancy, posterior leukoencephalopathy syndrome, and serious infections.  The patient understands that monitoring is required including a PPD at baseline and must alert us or the primary physician if symptoms of infection or other concerning signs are noted. Xolair Pregnancy And Lactation Text: This medication is Pregnancy Category B and is considered safe during pregnancy. This medication is excreted in breast milk. Protopic Counseling: Patient may experience a mild burning sensation during topical application. Protopic is not approved in children less than 2 years of age. There have been case reports of hematologic and skin malignancies in patients using topical calcineurin inhibitors although causality is questionable. Hydroxychloroquine Counseling:  I discussed with the patient that a baseline ophthalmologic exam is needed at the start of therapy and every year thereafter while on therapy. A CBC may also be warranted for monitoring.  The side effects of this medication were discussed with the patient, including but not limited to agranulocytosis, aplastic anemia, seizures, rashes, retinopathy, and liver toxicity. Patient instructed to call the office should any adverse effect occur.  The patient verbalized understanding of the proper use and possible adverse effects of Plaquenil.  All the patient's questions and concerns were addressed. Minocycline Pregnancy And Lactation Text: This medication is Pregnancy Category D and not consider safe during pregnancy. It is also excreted in breast milk. Colchicine Counseling:  Patient counseled regarding adverse effects including but not limited to stomach upset (nausea, vomiting, stomach pain, or diarrhea).  Patient instructed to limit alcohol consumption while taking this medication.  Colchicine may reduce blood counts especially with prolonged use.  The patient understands that monitoring of kidney function and blood counts may be required, especially at baseline. The patient verbalized understanding of the proper use and possible adverse effects of colchicine.  All of the patient's questions and concerns were addressed. High Dose Vitamin A Counseling: Side effects reviewed, pt to contact office should one occur. Picato Pregnancy And Lactation Text: This medication is Pregnancy Category C. It is unknown if this medication is excreted in breast milk. Rhofade Pregnancy And Lactation Text: This medication has not been assigned a Pregnancy Risk Category. It is unknown if the medication is excreted in breast milk. Prednisone Pregnancy And Lactation Text: This medication is Pregnancy Category C and it isn't know if it is safe during pregnancy. This medication is excreted in breast milk. Simponi Counseling:  I discussed with the patient the risks of golimumab including but not limited to myelosuppression, immunosuppression, autoimmune hepatitis, demyelinating diseases, lymphoma, and serious infections.  The patient understands that monitoring is required including a PPD at baseline and must alert us or the primary physician if symptoms of infection or other concerning signs are noted. Hydroxyzine Pregnancy And Lactation Text: This medication is not safe during pregnancy and should not be taken. It is also excreted in breast milk and breast feeding isn't recommended. Infliximab Pregnancy And Lactation Text: This medication is Pregnancy Category B and is considered safe during pregnancy. It is unknown if this medication is excreted in breast milk. Doxepin Counseling:  Patient advised that the medication is sedating and not to drive a car after taking this medication. Patient informed of potential adverse effects including but not limited to dry mouth, urinary retention, and blurry vision.  The patient verbalized understanding of the proper use and possible adverse effects of doxepin.  All of the patient's questions and concerns were addressed. Griseofulvin Pregnancy And Lactation Text: This medication is Pregnancy Category X and is known to cause serious birth defects. It is unknown if this medication is excreted in breast milk but breast feeding should be avoided. Infliximab Counseling:  I discussed with the patient the risks of infliximab including but not limited to myelosuppression, immunosuppression, autoimmune hepatitis, demyelinating diseases, lymphoma, and serious infections.  The patient understands that monitoring is required including a PPD at baseline and must alert us or the primary physician if symptoms of infection or other concerning signs are noted. Elidel Counseling: Patient may experience a mild burning sensation during topical application. Elidel is not approved in children less than 2 years of age. There have been case reports of hematologic and skin malignancies in patients using topical calcineurin inhibitors although causality is questionable. Albendazole Counseling:  I discussed with the patient the risks of albendazole including but not limited to cytopenia, kidney damage, nausea/vomiting and severe allergy.  The patient understands that this medication is being used in an off-label manner. Hydroxyzine Counseling: Patient advised that the medication is sedating and not to drive a car after taking this medication.  Patient informed of potential adverse effects including but not limited to dry mouth, urinary retention, and blurry vision.  The patient verbalized understanding of the proper use and possible adverse effects of hydroxyzine.  All of the patient's questions and concerns were addressed. Propranolol Counseling:  I discussed with the patient the risks of propranolol including but not limited to low heart rate, low blood pressure, low blood sugar, restlessness and increased cold sensitivity. They should call the office if they experience any of these side effects. Griseofulvin Counseling:  I discussed with the patient the risks of griseofulvin including but not limited to photosensitivity, cytopenia, liver damage, nausea/vomiting and severe allergy.  The patient understands that this medication is best absorbed when taken with a fatty meal (e.g., ice cream or french fries). Valtrex Pregnancy And Lactation Text: this medication is Pregnancy Category B and is considered safe during pregnancy. This medication is not directly found in breast milk but it's metabolite acyclovir is present. Dutasteride Pregnancy And Lactation Text: This medication is absolutely contraindicated in women, especially during pregnancy and breast feeding. Feminization of male fetuses is possible if taking while pregnant. Azathioprine Counseling:  I discussed with the patient the risks of azathioprine including but not limited to myelosuppression, immunosuppression, hepatotoxicity, lymphoma, and infections.  The patient understands that monitoring is required including baseline LFTs, Creatinine, possible TPMP genotyping and weekly CBCs for the first month and then every 2 weeks thereafter.  The patient verbalized understanding of the proper use and possible adverse effects of azathioprine.  All of the patient's questions and concerns were addressed. Birth Control Pills Pregnancy And Lactation Text: This medication should be avoided if pregnant and for the first 30 days post-partum. Topical Clindamycin Pregnancy And Lactation Text: This medication is Pregnancy Category B and is considered safe during pregnancy. It is unknown if it is excreted in breast milk. Wartpeel Counseling:  I discussed with the patient the risks of Wartpeel including but not limited to erythema, scaling, itching, weeping, crusting, and pain. Winlevi Pregnancy And Lactation Text: This medication is considered safe during pregnancy and breastfeeding. Odomzo Pregnancy And Lactation Text: This medication is Pregnancy Category X and is absolutely contraindicated during pregnancy. It is unknown if it is excreted in breast milk. Xeljanz Counseling: I discussed with the patient the risks of Xeljanz therapy including increased risk of infection, liver issues, headache, diarrhea, or cold symptoms. Live vaccines should be avoided. They were instructed to call if they have any problems. Carac Counseling:  I discussed with the patient the risks of Carac including but not limited to erythema, scaling, itching, weeping, crusting, and pain. Tetracycline Counseling: Patient counseled regarding possible photosensitivity and increased risk for sunburn.  Patient instructed to avoid sunlight, if possible.  When exposed to sunlight, patients should wear protective clothing, sunglasses, and sunscreen.  The patient was instructed to call the office immediately if the following severe adverse effects occur:  hearing changes, easy bruising/bleeding, severe headache, or vision changes.  The patient verbalized understanding of the proper use and possible adverse effects of tetracycline.  All of the patient's questions and concerns were addressed. Patient understands to avoid pregnancy while on therapy due to potential birth defects. Arava Counseling:  Patient counseled regarding adverse effects of Arava including but not limited to nausea, vomiting, abnormalities in liver function tests. Patients may develop mouth sores, rash, diarrhea, and abnormalities in blood counts. The patient understands that monitoring is required including LFTs and blood counts.  There is a rare possibility of scarring of the liver and lung problems that can occur when taking methotrexate. Persistent nausea, loss of appetite, pale stools, dark urine, cough, and shortness of breath should be reported immediately. Patient advised to discontinue Arava treatment and consult with a physician prior to attempting conception. The patient will have to undergo a treatment to eliminate Arava from the body prior to conception. Cephalexin Counseling: I counseled the patient regarding use of cephalexin as an antibiotic for prophylactic and/or therapeutic purposes. Cephalexin (commonly prescribed under brand name Keflex) is a cephalosporin antibiotic which is active against numerous classes of bacteria, including most skin bacteria. Side effects may include nausea, diarrhea, gastrointestinal upset, rash, hives, yeast infections, and in rare cases, hepatitis, kidney disease, seizures, fever, confusion, neurologic symptoms, and others. Patients with severe allergies to penicillin medications are cautioned that there is about a 10% incidence of cross-reactivity with cephalosporins. When possible, patients with penicillin allergies should use alternatives to cephalosporins for antibiotic therapy. Terbinafine Counseling: Patient counseling regarding adverse effects of terbinafine including but not limited to headache, diarrhea, rash, upset stomach, liver function test abnormalities, itching, taste/smell disturbance, nausea, abdominal pain, and flatulence.  There is a rare possibility of liver failure that can occur when taking terbinafine.  The patient understands that a baseline LFT and kidney function test may be required. The patient verbalized understanding of the proper use and possible adverse effects of terbinafine.  All of the patient's questions and concerns were addressed. Nsaids Pregnancy And Lactation Text: These medications are considered safe up to 30 weeks gestation. It is excreted in breast milk. Topical Sulfur Applications Pregnancy And Lactation Text: This medication is considered safe during pregnancy and breast feeding secondary to limited systemic absorption. Azithromycin Pregnancy And Lactation Text: This medication is considered safe during pregnancy and is also secreted in breast milk. Tazorac Counseling:  Patient advised that medication is irritating and drying.  Patient may need to apply sparingly and wash off after an hour before eventually leaving it on overnight.  The patient verbalized understanding of the proper use and possible adverse effects of tazorac.  All of the patient's questions and concerns were addressed. Thalidomide Counseling: I discussed with the patient the risks of thalidomide including but not limited to birth defects, anxiety, weakness, chest pain, dizziness, cough and severe allergy. Doxepin Pregnancy And Lactation Text: This medication is Pregnancy Category C and it isn't known if it is safe during pregnancy. It is also excreted in breast milk and breast feeding isn't recommended. Xelkotaz Pregnancy And Lactation Text: This medication is Pregnancy Category D and is not considered safe during pregnancy.  The risk during breast feeding is also uncertain. Tazorac Pregnancy And Lactation Text: This medication is not safe during pregnancy. It is unknown if this medication is excreted in breast milk. Bexarotene Pregnancy And Lactation Text: This medication is Pregnancy Category X and should not be given to women who are pregnant or may become pregnant. This medication should not be used if you are breast feeding. Rituxan Counseling:  I discussed with the patient the risks of Rituxan infusions. Side effects can include infusion reactions, severe drug rashes including mucocutaneous reactions, reactivation of latent hepatitis and other infections and rarely progressive multifocal leukoencephalopathy.  All of the patient's questions and concerns were addressed. Metronidazole Pregnancy And Lactation Text: This medication is Pregnancy Category B and considered safe during pregnancy.  It is also excreted in breast milk. Stelara Counseling:  I discussed with the patient the risks of ustekinumab including but not limited to immunosuppression, malignancy, posterior leukoencephalopathy syndrome, and serious infections.  The patient understands that monitoring is required including a PPD at baseline and must alert us or the primary physician if symptoms of infection or other concerning signs are noted. Enbrel Counseling:  I discussed with the patient the risks of etanercept including but not limited to myelosuppression, immunosuppression, autoimmune hepatitis, demyelinating diseases, lymphoma, and infections.  The patient understands that monitoring is required including a PPD at baseline and must alert us or the primary physician if symptoms of infection or other concerning signs are noted. Doxycycline Counseling:  Patient counseled regarding possible photosensitivity and increased risk for sunburn.  Patient instructed to avoid sunlight, if possible.  When exposed to sunlight, patients should wear protective clothing, sunglasses, and sunscreen.  The patient was instructed to call the office immediately if the following severe adverse effects occur:  hearing changes, easy bruising/bleeding, severe headache, or vision changes.  The patient verbalized understanding of the proper use and possible adverse effects of doxycycline.  All of the patient's questions and concerns were addressed. Acitretin Counseling:  I discussed with the patient the risks of acitretin including but not limited to hair loss, dry lips/skin/eyes, liver damage, hyperlipidemia, depression/suicidal ideation, photosensitivity.  Serious rare side effects can include but are not limited to pancreatitis, pseudotumor cerebri, bony changes, clot formation/stroke/heart attack.  Patient understands that alcohol is contraindicated since it can result in liver toxicity and significantly prolong the elimination of the drug by many years. Taltz Counseling: I discussed with the patient the risks of ixekizumab including but not limited to immunosuppression, serious infections, worsening of inflammatory bowel disease and drug reactions.  The patient understands that monitoring is required including a PPD at baseline and must alert us or the primary physician if symptoms of infection or other concerning signs are noted. Glycopyrrolate Pregnancy And Lactation Text: This medication is Pregnancy Category B and is considered safe during pregnancy. It is unknown if it is excreted breast milk. Fluconazole Counseling:  Patient counseled regarding adverse effects of fluconazole including but not limited to headache, diarrhea, nausea, upset stomach, liver function test abnormalities, taste disturbance, and stomach pain.  There is a rare possibility of liver failure that can occur when taking fluconazole.  The patient understands that monitoring of LFTs and kidney function test may be required, especially at baseline. The patient verbalized understanding of the proper use and possible adverse effects of fluconazole.  All of the patient's questions and concerns were addressed. Xolair Counseling:  Patient informed of potential adverse effects including but not limited to fever, muscle aches, rash and allergic reactions.  The patient verbalized understanding of the proper use and possible adverse effects of Xolair.  All of the patient's questions and concerns were addressed. Rituxan Pregnancy And Lactation Text: This medication is Pregnancy Category C and it isn't know if it is safe during pregnancy. It is unknown if this medication is excreted in breast milk but similar antibodies are known to be excreted. Propranolol Pregnancy And Lactation Text: This medication is Pregnancy Category C and it isn't known if it is safe during pregnancy. It is excreted in breast milk. Birth Control Pills Counseling: Birth Control Pill Counseling: I discussed with the patient the potential side effects of OCPs including but not limited to increased risk of stroke, heart attack, thrombophlebitis, deep venous thrombosis, hepatic adenomas, breast changes, GI upset, headaches, and depression.  The patient verbalized understanding of the proper use and possible adverse effects of OCPs. All of the patient's questions and concerns were addressed. Cephalexin Pregnancy And Lactation Text: This medication is Pregnancy Category B and considered safe during pregnancy.  It is also excreted in breast milk but can be used safely for shorter doses. Erythromycin Counseling:  I discussed with the patient the risks of erythromycin including but not limited to GI upset, allergic reaction, drug rash, diarrhea, increase in liver enzymes, and yeast infections. Solaraze Counseling:  I discussed with the patient the risks of Solaraze including but not limited to erythema, scaling, itching, weeping, crusting, and pain. Valtrex Counseling: I discussed with the patient the risks of valacyclovir including but not limited to kidney damage, nausea, vomiting and severe allergy.  The patient understands that if the infection seems to be worsening or is not improving, they are to call. Calcipotriene Pregnancy And Lactation Text: This medication has not been proven safe during pregnancy. It is unknown if this medication is excreted in breast milk. Siliq Counseling:  I discussed with the patient the risks of Siliq including but not limited to new or worsening depression, suicidal thoughts and behavior, immunosuppression, malignancy, posterior leukoencephalopathy syndrome, and serious infections.  The patient understands that monitoring is required including a PPD at baseline and must alert us or the primary physician if symptoms of infection or other concerning signs are noted. There is also a special program designed to monitor depression which is required with Siliq. Dutasteride Male Counseling: Dustasteride Counseling:  I discussed with the patient the risks of use of dutasteride including but not limited to decreased libido, decreased ejaculate volume, and gynecomastia. Women who can become pregnant should not handle medication.  All of the patient's questions and concerns were addressed. Quinolones Counseling:  I discussed with the patient the risks of fluoroquinolones including but not limited to GI upset, allergic reaction, drug rash, diarrhea, dizziness, photosensitivity, yeast infections, liver function test abnormalities, tendonitis/tendon rupture. Libtayo Pregnancy And Lactation Text: This medication is contraindicated in pregnancy and when breast feeding. Topical Clindamycin Counseling: Patient counseled that this medication may cause skin irritation or allergic reactions.  In the event of skin irritation, the patient was advised to reduce the amount of the drug applied or use it less frequently.   The patient verbalized understanding of the proper use and possible adverse effects of clindamycin.  All of the patient's questions and concerns were addressed. Niacinamide Counseling: I recommended taking niacin or niacinamide, also know as vitamin B3, twice daily. Recent evidence suggests that taking vitamin B3 (500 mg twice daily) can reduce the risk of actinic keratoses and non-melanoma skin cancers. Side effects of vitamin B3 include flushing and headache. Ketoconazole Counseling:   Patient counseled regarding improving absorption with orange juice.  Adverse effects include but are not limited to breast enlargement, headache, diarrhea, nausea, upset stomach, liver function test abnormalities, taste disturbance, and stomach pain.  There is a rare possibility of liver failure that can occur when taking ketoconazole. The patient understands that monitoring of LFTs may be required, especially at baseline. The patient verbalized understanding of the proper use and possible adverse effects of ketoconazole.  All of the patient's questions and concerns were addressed. Ivermectin Pregnancy And Lactation Text: This medication is Pregnancy Category C and it isn't known if it is safe during pregnancy. It is also excreted in breast milk. Topical Ketoconazole Counseling: Patient counseled that this medication may cause skin irritation or allergic reactions.  In the event of skin irritation, the patient was advised to reduce the amount of the drug applied or use it less frequently.   The patient verbalized understanding of the proper use and possible adverse effects of ketoconazole.  All of the patient's questions and concerns were addressed. Ivermectin Counseling:  Patient instructed to take medication on an empty stomach with a full glass of water.  Patient informed of potential adverse effects including but not limited to nausea, diarrhea, dizziness, itching, and swelling of the extremities or lymph nodes.  The patient verbalized understanding of the proper use and possible adverse effects of ivermectin.  All of the patient's questions and concerns were addressed. Cyclosporine Counseling:  I discussed with the patient the risks of cyclosporine including but not limited to hypertension, gingival hyperplasia,myelosuppression, immunosuppression, liver damage, kidney damage, neurotoxicity, lymphoma, and serious infections. The patient understands that monitoring is required including baseline blood pressure, CBC, CMP, lipid panel and uric acid, and then 1-2 times monthly CMP and blood pressure. Bactrim Pregnancy And Lactation Text: This medication is Pregnancy Category D and is known to cause fetal risk.  It is also excreted in breast milk. Finasteride Male Counseling: Finasteride Counseling:  I discussed with the patient the risks of use of finasteride including but not limited to decreased libido, decreased ejaculate volume, gynecomastia, and depression. Women should not handle medication.  All of the patient's questions and concerns were addressed. Methotrexate Counseling:  Patient counseled regarding adverse effects of methotrexate including but not limited to nausea, vomiting, abnormalities in liver function tests. Patients may develop mouth sores, rash, diarrhea, and abnormalities in blood counts. The patient understands that monitoring is required including LFT's and blood counts.  There is a rare possibility of scarring of the liver and lung problems that can occur when taking methotrexate. Persistent nausea, loss of appetite, pale stools, dark urine, cough, and shortness of breath should be reported immediately. Patient advised to discontinue methotrexate treatment at least three months before attempting to become pregnant.  I discussed the need for folate supplements while taking methotrexate.  These supplements can decrease side effects during methotrexate treatment. The patient verbalized understanding of the proper use and possible adverse effects of methotrexate.  All of the patient's questions and concerns were addressed. Sarecycline Counseling: Patient advised regarding possible photosensitivity and discoloration of the teeth, skin, lips, tongue and gums.  Patient instructed to avoid sunlight, if possible.  When exposed to sunlight, patients should wear protective clothing, sunglasses, and sunscreen.  The patient was instructed to call the office immediately if the following severe adverse effects occur:  hearing changes, easy bruising/bleeding, severe headache, or vision changes.  The patient verbalized understanding of the proper use and possible adverse effects of sarecycline.  All of the patient's questions and concerns were addressed. Azelaic Acid Counseling: Patient counseled that medicine may cause skin irritation and to avoid applying near the eyes.  In the event of skin irritation, the patient was advised to reduce the amount of the drug applied or use it less frequently.   The patient verbalized understanding of the proper use and possible adverse effects of azelaic acid.  All of the patient's questions and concerns were addressed. Eucrisa Counseling: Patient may experience a mild burning sensation during topical application. Eucrisa is not approved in children less than 2 years of age. Cimzia Pregnancy And Lactation Text: This medication crosses the placenta but can be considered safe in certain situations. Cimzia may be excreted in breast milk. Calcipotriene Counseling:  I discussed with the patient the risks of calcipotriene including but not limited to erythema, scaling, itching, and irritation. Cellcept Counseling:  I discussed with the patient the risks of mycophenolate mofetil including but not limited to infection/immunosuppression, GI upset, hypokalemia, hypercholesterolemia, bone marrow suppression, lymphoproliferative disorders, malignancy, GI ulceration/bleed/perforation, colitis, interstitial lung disease, kidney failure, progressive multifocal leukoencephalopathy, and birth defects.  The patient understands that monitoring is required including a baseline creatinine and regular CBC testing. In addition, patient must alert us immediately if symptoms of infection or other concerning signs are noted. Colchicine Pregnancy And Lactation Text: This medication is Pregnancy Category C and isn't considered safe during pregnancy. It is excreted in breast milk. Clofazimine Counseling:  I discussed with the patient the risks of clofazimine including but not limited to skin and eye pigmentation, liver damage, nausea/vomiting, gastrointestinal bleeding and allergy. Winlevi Counseling:  I discussed with the patient the risks of topical clascoterone including but not limited to erythema, scaling, itching, and stinging. Patient voiced their understanding. Detail Level: Detailed Spironolactone Pregnancy And Lactation Text: This medication can cause feminization of the male fetus and should be avoided during pregnancy. The active metabolite is also found in breast milk. Azelaic Acid Pregnancy And Lactation Text: This medication is considered safe during pregnancy and breast feeding. Gabapentin Counseling: I discussed with the patient the risks of gabapentin including but not limited to dizziness, somnolence, fatigue and ataxia. Mirvaso Counseling: Mirvaso is a topical medication which can decrease superficial blood flow where applied. Side effects are uncommon and include stinging, redness and allergic reactions. Zyclara Counseling:  I discussed with the patient the risks of imiquimod including but not limited to erythema, scaling, itching, weeping, crusting, and pain.  Patient understands that the inflammatory response to imiquimod is variable from person to person and was educated regarded proper titration schedule.  If flu-like symptoms develop, patient knows to discontinue the medication and contact us. Skyrizi Counseling: I discussed with the patient the risks of risankizumab-rzaa including but not limited to immunosuppression, and serious infections.  The patient understands that monitoring is required including a PPD at baseline and must alert us or the primary physician if symptoms of infection or other concerning signs are noted. Bactrim Counseling:  I discussed with the patient the risks of sulfa antibiotics including but not limited to GI upset, allergic reaction, drug rash, diarrhea, dizziness, photosensitivity, and yeast infections.  Rarely, more serious reactions can occur including but not limited to aplastic anemia, agranulocytosis, methemoglobinemia, blood dyscrasias, liver or kidney failure, lung infiltrates or desquamative/blistering drug rashes. SSKI Counseling:  I discussed with the patient the risks of SSKI including but not limited to thyroid abnormalities, metallic taste, GI upset, fever, headache, acne, arthralgias, paraesthesias, lymphadenopathy, easy bleeding, arrhythmias, and allergic reaction. Picato Counseling:  I discussed with the patient the risks of Picato including but not limited to erythema, scaling, itching, weeping, crusting, and pain. Benzoyl Peroxide Pregnancy And Lactation Text: This medication is Pregnancy Category C. It is unknown if benzoyl peroxide is excreted in breast milk. Finasteride Pregnancy And Lactation Text: This medication is absolutely contraindicated during pregnancy. It is unknown if it is excreted in breast milk. Itraconazole Counseling:  I discussed with the patient the risks of itraconazole including but not limited to liver damage, nausea/vomiting, neuropathy, and severe allergy.  The patient understands that this medication is best absorbed when taken with acidic beverages such as non-diet cola or ginger ale.  The patient understands that monitoring is required including baseline LFTs and repeat LFTs at intervals.  The patient understands that they are to contact us or the primary physician if concerning signs are noted. Rifampin Counseling: I discussed with the patient the risks of rifampin including but not limited to liver damage, kidney damage, red-orange body fluids, nausea/vomiting and severe allergy. Cimetidine Pregnancy And Lactation Text: This medication is Pregnancy Category B and is considered safe during pregnancy. It is also excreted in breast milk and breast feeding isn't recommended. Bexarotene Counseling:  I discussed with the patient the risks of bexarotene including but not limited to hair loss, dry lips/skin/eyes, liver abnormalities, hyperlipidemia, pancreatitis, depression/suicidal ideation, photosensitivity, drug rash/allergic reactions, hypothyroidism, anemia, leukopenia, infection, cataracts, and teratogenicity.  Patient understands that they will need regular blood tests to check lipid profile, liver function tests, white blood cell count, thyroid function tests and pregnancy test if applicable. 5-Fu Counseling: 5-Fluorouracil Counseling:  I discussed with the patient the risks of 5-fluorouracil including but not limited to erythema, scaling, itching, weeping, crusting, and pain. Spironolactone Counseling: Patient advised regarding risks of diarrhea, abdominal pain, hyperkalemia, birth defects (for female patients), liver toxicity and renal toxicity. The patient may need blood work to monitor liver and kidney function and potassium levels while on therapy. The patient verbalized understanding of the proper use and possible adverse effects of spironolactone.  All of the patient's questions and concerns were addressed. Dupixent Counseling: I discussed with the patient the risks of dupilumab including but not limited to eye infection and irritation, cold sores, injection site reactions, worsening of asthma, allergic reactions and increased risk of parasitic infection.  Live vaccines should be avoided while taking dupilumab. Dupilumab will also interact with certain medications such as warfarin and cyclosporine. The patient understands that monitoring is required and they must alert us or the primary physician if symptoms of infection or other concerning signs are noted. Tremfya Counseling: I discussed with the patient the risks of guselkumab including but not limited to immunosuppression, serious infections, and drug reactions.  The patient understands that monitoring is required including a PPD at baseline and must alert us or the primary physician if symptoms of infection or other concerning signs are noted. Metronidazole Counseling:  I discussed with the patient the risks of metronidazole including but not limited to seizures, nausea/vomiting, a metallic taste in the mouth, nausea/vomiting and severe allergy. Dupixent Pregnancy And Lactation Text: This medication likely crosses the placenta but the risk for the fetus is uncertain. This medication is excreted in breast milk. Methotrexate Pregnancy And Lactation Text: This medication is Pregnancy Category X and is known to cause fetal harm. This medication is excreted in breast milk. Azithromycin Counseling:  I discussed with the patient the risks of azithromycin including but not limited to GI upset, allergic reaction, drug rash, diarrhea, and yeast infections. Protopic Pregnancy And Lactation Text: This medication is Pregnancy Category C. It is unknown if this medication is excreted in breast milk when applied topically. Ketoconazole Pregnancy And Lactation Text: This medication is Pregnancy Category C and it isn't know if it is safe during pregnancy. It is also excreted in breast milk and breast feeding isn't recommended. Minoxidil Counseling: Minoxidil is a topical medication which can increase blood flow where it is applied. It is uncertain how this medication increases hair growth. Side effects are uncommon and include stinging and allergic reactions. Otezla Pregnancy And Lactation Text: This medication is Pregnancy Category C and it isn't known if it is safe during pregnancy. It is unknown if it is excreted in breast milk. Hydroxychloroquine Pregnancy And Lactation Text: This medication has been shown to cause fetal harm but it isn't assigned a Pregnancy Risk Category. There are small amounts excreted in breast milk. Oral Minoxidil Counseling- I discussed with the patient the risks of oral minoxidil including but not limited to shortness of breath, swelling of the feet or ankles, dizziness, lightheadedness, unwanted hair growth and allergic reaction.  The patient verbalized understanding of the proper use and possible adverse effects of oral minoxidil.  All of the patient's questions and concerns were addressed. Humira Counseling:  I discussed with the patient the risks of adalimumab including but not limited to myelosuppression, immunosuppression, autoimmune hepatitis, demyelinating diseases, lymphoma, and serious infections.  The patient understands that monitoring is required including a PPD at baseline and must alert us or the primary physician if symptoms of infection or other concerning signs are noted.
